# Patient Record
Sex: FEMALE | Race: WHITE | NOT HISPANIC OR LATINO | URBAN - METROPOLITAN AREA
[De-identification: names, ages, dates, MRNs, and addresses within clinical notes are randomized per-mention and may not be internally consistent; named-entity substitution may affect disease eponyms.]

---

## 2017-06-05 ENCOUNTER — INPATIENT (INPATIENT)
Facility: HOSPITAL | Age: 62
LOS: 6 days | Discharge: HOME | DRG: 871 | End: 2017-06-12
Attending: HOSPITALIST | Admitting: HOSPITALIST
Payer: COMMERCIAL

## 2017-06-05 VITALS
SYSTOLIC BLOOD PRESSURE: 107 MMHG | HEART RATE: 90 BPM | DIASTOLIC BLOOD PRESSURE: 63 MMHG | OXYGEN SATURATION: 97 % | TEMPERATURE: 98 F | RESPIRATION RATE: 20 BRPM

## 2017-06-05 DIAGNOSIS — Z29.9 ENCOUNTER FOR PROPHYLACTIC MEASURES, UNSPECIFIED: ICD-10-CM

## 2017-06-05 DIAGNOSIS — R63.8 OTHER SYMPTOMS AND SIGNS CONCERNING FOOD AND FLUID INTAKE: ICD-10-CM

## 2017-06-05 DIAGNOSIS — M79.7 FIBROMYALGIA: ICD-10-CM

## 2017-06-05 DIAGNOSIS — A41.9 SEPSIS, UNSPECIFIED ORGANISM: ICD-10-CM

## 2017-06-05 DIAGNOSIS — J18.9 PNEUMONIA, UNSPECIFIED ORGANISM: ICD-10-CM

## 2017-06-05 LAB
EOSINOPHIL NFR BLD AUTO: 2 % — SIGNIFICANT CHANGE UP (ref 0–6)
LACTATE SERPL-SCNC: 1 MMOL/L — SIGNIFICANT CHANGE UP (ref 0.5–2)
LYMPHOCYTES # BLD AUTO: 4 % — LOW (ref 13–44)
MANUAL DIF COMMENT BLD-IMP: SIGNIFICANT CHANGE UP
MANUAL SMEAR VERIFICATION: SIGNIFICANT CHANGE UP
MONOCYTES NFR BLD AUTO: 2 % — SIGNIFICANT CHANGE UP (ref 2–14)
NEUTROPHILS NFR BLD AUTO: 66 % — SIGNIFICANT CHANGE UP (ref 43–77)
NEUTS BAND # BLD: 26 % — HIGH
PLAT MORPH BLD: (no result)
RBC BLD AUTO: NORMAL — SIGNIFICANT CHANGE UP

## 2017-06-05 PROCEDURE — 93010 ELECTROCARDIOGRAM REPORT: CPT | Mod: 59

## 2017-06-05 PROCEDURE — 71020: CPT | Mod: 26

## 2017-06-05 PROCEDURE — 99223 1ST HOSP IP/OBS HIGH 75: CPT

## 2017-06-05 PROCEDURE — 99285 EMERGENCY DEPT VISIT HI MDM: CPT | Mod: 25

## 2017-06-05 RX ORDER — HEPARIN SODIUM 5000 [USP'U]/ML
5000 INJECTION INTRAVENOUS; SUBCUTANEOUS EVERY 8 HOURS
Qty: 0 | Refills: 0 | Status: DISCONTINUED | OUTPATIENT
Start: 2017-06-05 | End: 2017-06-12

## 2017-06-05 RX ORDER — IPRATROPIUM/ALBUTEROL SULFATE 18-103MCG
3 AEROSOL WITH ADAPTER (GRAM) INHALATION EVERY 4 HOURS
Qty: 0 | Refills: 0 | Status: DISCONTINUED | OUTPATIENT
Start: 2017-06-05 | End: 2017-06-12

## 2017-06-05 RX ORDER — SODIUM CHLORIDE 9 MG/ML
1000 INJECTION INTRAMUSCULAR; INTRAVENOUS; SUBCUTANEOUS ONCE
Qty: 0 | Refills: 0 | Status: COMPLETED | OUTPATIENT
Start: 2017-06-05 | End: 2017-06-05

## 2017-06-05 RX ORDER — ACETAMINOPHEN 500 MG
975 TABLET ORAL ONCE
Qty: 0 | Refills: 0 | Status: COMPLETED | OUTPATIENT
Start: 2017-06-05 | End: 2017-06-05

## 2017-06-05 RX ORDER — ALPRAZOLAM 0.25 MG
0.5 TABLET ORAL ONCE
Qty: 0 | Refills: 0 | Status: DISCONTINUED | OUTPATIENT
Start: 2017-06-05 | End: 2017-06-05

## 2017-06-05 RX ORDER — IPRATROPIUM/ALBUTEROL SULFATE 18-103MCG
3 AEROSOL WITH ADAPTER (GRAM) INHALATION ONCE
Qty: 0 | Refills: 0 | Status: COMPLETED | OUTPATIENT
Start: 2017-06-05 | End: 2017-06-05

## 2017-06-05 RX ADMIN — SODIUM CHLORIDE 1000 MILLILITER(S): 9 INJECTION INTRAMUSCULAR; INTRAVENOUS; SUBCUTANEOUS at 16:05

## 2017-06-05 RX ADMIN — Medication 3 MILLILITER(S): at 17:15

## 2017-06-05 RX ADMIN — Medication 3 MILLILITER(S): at 23:52

## 2017-06-05 RX ADMIN — Medication 0.5 MILLIGRAM(S): at 23:52

## 2017-06-05 RX ADMIN — Medication 975 MILLIGRAM(S): at 16:21

## 2017-06-05 NOTE — ED ADULT NURSE NOTE - OBJECTIVE STATEMENT
60 y/o female w/ no PMH (only on supplements) c/o SOB, weakness, fevers, dark urine, chest pressure x5 days. Patient states, "I was having fevers and feeling weak last week so I had a home nurse come and give me 2000 mL NS to hydrate me. My MD gave me tamiflu and now I started a Z pack (first dose last night). The highest fever was 103 this morning, I took two advil at 10:00 am." Denies nausea, vomiting. Reports good PO intake. Denies syncope.

## 2017-06-05 NOTE — ED ADULT TRIAGE NOTE - CHIEF COMPLAINT QUOTE
patient BIBA form home complains of fever and cough with SOB x 5 days. she states that she took first dose of z-pack last night. patient states that at 10AM her temp was 102 and she took 2 tylenol. denies chest pain, dizziness. no medical problems

## 2017-06-05 NOTE — ED PROVIDER NOTE - PHYSICAL EXAMINATION
CONSTITUTIONAL: Well appearing, well nourished, awake, alert and in no apparent distress.  HEENT: Head is atraumatic. Eyes clear bilaterally, normal EOMI. Airway patent.  CARDIAC: Normal rate, regular rhythm.  Heart sounds S1, S2.   RESPIRATORY: Breath sounds clear and equal bilaterally. Rales in LLL.   GASTROINTESTINAL: Abdomen soft, non-tender, no guarding.  MUSCULOSKELETAL: Spine appears normal, range of motion is not limited, no muscle or joint tenderness.   NEUROLOGICAL: Alert and oriented, no focal deficits, no motor or sensory deficits.  SKIN: Skin normal color for race, warm, dry and intact. No evidence of rash.  PSYCHIATRIC: Alert and oriented to person, place, time/situation. normal mood and affect. no apparent risk to self or others.

## 2017-06-05 NOTE — H&P ADULT - NSHPLABSRESULTS_GEN_ALL_CORE
.  LABS:                         12.4   10.8  )-----------( 183      ( 05 Jun 2017 13:45 )             36.1     06-05    138  |  103  |  5<L>  ----------------------------<  117<H>  3.9   |  22  |  0.50    Ca    8.8      05 Jun 2017 13:45    TPro  6.8  /  Alb  3.3  /  TBili  0.7  /  DBili  x   /  AST  39  /  ALT  49<H>  /  AlkPhos  134<H>  06-05        CARDIAC MARKERS ( 05 Jun 2017 13:45 )  x     / <0.01 ng/mL / 28 U/L / x     / <1.0 ng/mL        Lactate, Blood: 1.0 mmoL/L (06-05 @ 14:46)      RADIOLOGY, EKG & ADDITIONAL TESTS: Reviewed.

## 2017-06-05 NOTE — H&P ADULT - NSHPREVIEWOFSYSTEMS_GEN_ALL_CORE
REVIEW OF SYSTEMS:    CONSTITUTIONAL: malaise, fever  EYES/ENT: No visual changes;  No vertigo or throat pain   NECK: No pain or stiffness  RESPIRATORY: SOB, cough (see HPI)  CARDIOVASCULAR: No chest pain or palpitations  GASTROINTESTINAL: No abdominal or epigastric pain. No nausea, vomiting, or hematemesis; No diarrhea or constipation. No melena or hematochezia.  GENITOURINARY: No dysuria, frequency or hematuria  NEUROLOGICAL: No numbness or weakness  SKIN: No itching, burning, rashes, or lesions   All other review of systems is negative unless indicated above.

## 2017-06-05 NOTE — H&P ADULT - PROBLEM SELECTOR PLAN 1
Pt meets SIRS criteria (Fever 101, HR 93), suspected source of infection is PNA, with LLL consolidation seen on CXR, egophony ausculated at left lung base on exam. Pt normotensive, mentating well, extremities WWP, making urine. Lactate 1.0  - Pt s/p 1L NS in ED

## 2017-06-05 NOTE — H&P ADULT - PROBLEM SELECTOR PLAN 2
Pt with no recent hospitalization or recent use of IV Abx (only took 1 dose of Azithromycin), reports rash with Penicillin  - Pt able to take PO, continue Levaquin 750mg PO daily Pt with no recent hospitalization or recent use of IV Abx (only took 1 dose of Azithromycin), reports rash with Penicillin  - f/u BCx  - Pt able to take PO, continue Levaquin 750mg PO daily  - c/w oxygen therapy with NC as needed Pt with no recent hospitalization or recent use of IV Abx (only took 1 dose of Azithromycin), reports rash with Penicillin  - f/u BCx  - Pt able to take PO, continue Levaquin 750mg PO daily  - c/w oxygen therapy with NC as needed, SpO2 %, though pt reports some chest tightness, so was placed on 2L NC  - Tylenol PRN for fever Pt with no recent hospitalization or recent use of IV Abx (only took 1 dose of Azithromycin), reports rash with Penicillin  - f/u BCx  - Pt able to take PO, continue Levaquin 750mg PO daily for 5 day course  - c/w oxygen therapy with NC as needed, SpO2 %, though pt reports some chest tightness, so was placed on 2L NC  - Tylenol PRN for fever

## 2017-06-05 NOTE — ED PROVIDER NOTE - MEDICAL DECISION MAKING DETAILS
s/s as above, less likely ACS, CHF. XR reviewed with large infiltrate, allergic to penicillin, levaquin given in ED. 1L administered by EMS, given one more litre NS. requiring low dose NC, pt preferring for admission, discussed with med.

## 2017-06-05 NOTE — H&P ADULT - HISTORY OF PRESENT ILLNESS
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 Pt recently returned [place] on Tuesday last week and started to develop non-productive cough on Thursday, with fevers up to 103 at home. She started taking Tamiflu without improvement, so took one dose of Azithromycin yesterday. She started to feel more SOB today with some chest tightness, so decided to come to the ED. Pt denies chills/rigors, recent weight loss, night sweats, runny nose, watery eyes, sore throat, wheezing, neck stiffness, photophobia, chest pain, palpitations, abdominal pain, n/v/d/c, dysuria. Pt denies any sick contacts, animal contacts, recent hospitalization or Abx use (except 1 dose of azithro). In ED, T 101. HR 93, /63, RR 17-20, [id number]% on RA/2L NC. Labs significant for WBC 10.8 with 89.2 Neutrophil, lactate wnl (1.0), CXR significant for LLL consolidation. Pt received 1L NS, Duoneb x1, Tylenol 975mg x1. 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 Pt recently returned [place] on Tuesday last week and started to develop non-productive cough on Thursday, with fevers up to 103 at home. She started taking Tamiflu without improvement, so took one dose of Azithromycin yesterday. She started to feel more SOB today with some chest tightness, so decided to come to the ED. Pt denies chills/rigors, recent weight loss, night sweats, runny nose, watery eyes, sore throat, wheezing, neck stiffness, photophobia, chest pain, palpitations, abdominal pain, n/v/d/c, dysuria. Pt denies any sick contacts, animal contacts, recent hospitalization or Abx use (except 1 dose of azithro). In ED, T 101. HR 93, /63, RR 17-20, [id number]% on RA/2L NC. Labs significant for WBC 10.8 with 89.2 Neutrophil, 26% Band, lactate wnl (1.0), CXR significant for LLL consolidation. Pt received 1L NS, Duoneb x1, Tylenol 975mg x1.

## 2017-06-05 NOTE — H&P ADULT - ATTENDING COMMENTS
61F hx fibromyalgia who presents with c/o mostly nonproductive cough and fevers since Thursday. Pt says she returned from [place] on Tuesday and 2d later started having symptoms. 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 She prescribed herself Azithromycin 1d ago, of which she took 1 dose. SOB worsened today, prompting ED visit. SIRS criteria met based on fever, tachycardia, RR. +hypoxia. +LLL infiltrate on CXR.  VS as above  NAD, AAOx3  Slightly dry MMs  Borderline tachycardic, no murmurs  +LLL decreased BS with +egophony  Abd benign  LLE with trace edema  Labs and imaging reviewed  A/P: 1) Sepsis 2/2 CAP--+SIRS with LLL infiltrate consistent with likely CAP. Pt is hypoxic. Continue Levaquin, supplemental O2 PRN. Nebs PRN. Guiafenesen. F/u cultures.  2) Elevated LFTs--Borderline. Likely related to sepsis. Will trend for now.  3) Fibromyalgia--Stable. No meds.

## 2017-06-05 NOTE — ED PROVIDER NOTE - OBJECTIVE STATEMENT
pt with hx of fibromyalgia presenting with sob 51 with hx of fibromyalgia presenting with sob, fever, cough, myalgia ongoing for the past week. Took one tablet of azithromycin  yesterday and stated fever still persisting. no abd pain, vomiting, nausea. no known sick contacts. 51 with hx of fibromyalgia presenting with sob, fever, cough, myalgia ongoing for the past week. Took one tablet of azithromycin  yesterday and stated fever still persisting. no abd pain, vomiting, nausea. no known sick contacts. retired internist by profession.

## 2017-06-05 NOTE — H&P ADULT - NSHPPHYSICALEXAM_GEN_ALL_CORE
.  VITAL SIGNS:  T(C): 38.3, Max: 38.3 (06-05 @ 16:12)  T(F): 101, Max: 101 (06-05 @ 16:12)  HR: 93 (81 - 93)  BP: 121/72 (107/63 - 128/66)  BP(mean): --  RR: 17 (17 - 20)  SpO2: 99% (97% - 100%)  Wt(kg): --    PHYSICAL EXAM:    Constitutional: WDWN resting comfortably in bed; NAD  Head: NC/AT  Eyes: PERRLA, EOMI, clear conjunctiva  ENT: no nasal discharge; no oropharyngeal erythema or exudates; dry oral mucosa  Neck: supple; no JVD or thyromegaly  Respiratory: decreased breath sounds at left base with egophony, right lung sounds clear to auscultation, no wheezing or rhonchi, no accessory muscle use  Cardiac: +S1/S2; RRR; no M/R/G; PMI non-displaced  Gastrointestinal: soft, NT/ND; no rebound or guarding; +BS, no hepatosplenomegaly  Extremities: WWP, no clubbing or cyanosis; no peripheral edema  Vascular: 2+ radial, DP/PT pulses B/L  Lymphatic: no submandibular or cervical LAD  Neurologic: AAOx3; no focal deficits, answers questions appropriately, follows commands, moves all extremities .  VITAL SIGNS:  T(C): 38.3, Max: 38.3 (06-05 @ 16:12)  T(F): 101, Max: 101 (06-05 @ 16:12)  HR: 93 (81 - 93)  BP: 121/72 (107/63 - 128/66)  BP(mean): --  RR: 17 (17 - 20)  SpO2: 99% (97% - 100%)  Wt(kg): --    PHYSICAL EXAM:    Constitutional: WDWN resting comfortably in bed; NAD, non-toxic appearing  Head: NC/AT  Eyes: PERRLA, EOMI, clear conjunctiva  ENT: no nasal discharge; no oropharyngeal erythema or exudates; dry oral mucosa  Neck: supple; no JVD or thyromegaly  Respiratory: decreased breath sounds at left base with egophony, right lung sounds clear to auscultation, no wheezing or rhonchi, no accessory muscle use  Cardiac: +S1/S2; RRR; no M/R/G; PMI non-displaced  Gastrointestinal: soft, NT/ND; no rebound or guarding; +BS, no hepatosplenomegaly  Extremities: WWP, no clubbing or cyanosis; no peripheral edema  Vascular: 2+ radial, DP/PT pulses B/L  Lymphatic: no submandibular or cervical LAD  Neurologic: AAOx3; no focal deficits, answers questions appropriately, follows commands, moves all extremities

## 2017-06-05 NOTE — H&P ADULT - ASSESSMENT
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

## 2017-06-05 NOTE — ED PROVIDER NOTE - DIAGNOSTIC INTERPRETATION
ER Physician: Viviana Bang MD  CHEST XRAY INTERPRETATION: large LLL infiltrate, heart shadow normal, bony structures intact

## 2017-06-06 LAB
ANION GAP SERPL CALC-SCNC: 13 MMOL/L — SIGNIFICANT CHANGE UP (ref 5–17)
APPEARANCE UR: CLEAR — SIGNIFICANT CHANGE UP
BASOPHILS NFR BLD AUTO: 0.2 % — SIGNIFICANT CHANGE UP (ref 0–2)
BILIRUB UR-MCNC: NEGATIVE — SIGNIFICANT CHANGE UP
BUN SERPL-MCNC: 5 MG/DL — LOW (ref 7–23)
CALCIUM SERPL-MCNC: 8.4 MG/DL — SIGNIFICANT CHANGE UP (ref 8.4–10.5)
CHLORIDE SERPL-SCNC: 105 MMOL/L — SIGNIFICANT CHANGE UP (ref 96–108)
CO2 SERPL-SCNC: 22 MMOL/L — SIGNIFICANT CHANGE UP (ref 22–31)
COLOR SPEC: YELLOW — SIGNIFICANT CHANGE UP
CREAT SERPL-MCNC: 0.5 MG/DL — SIGNIFICANT CHANGE UP (ref 0.5–1.3)
DIFF PNL FLD: NEGATIVE — SIGNIFICANT CHANGE UP
EOSINOPHIL NFR BLD AUTO: 0.2 % — SIGNIFICANT CHANGE UP (ref 0–6)
GLUCOSE SERPL-MCNC: 129 MG/DL — HIGH (ref 70–99)
GLUCOSE UR QL: NEGATIVE — SIGNIFICANT CHANGE UP
HCT VFR BLD CALC: 31.7 % — LOW (ref 34.5–45)
HGB BLD-MCNC: 10.7 G/DL — LOW (ref 11.5–15.5)
KETONES UR-MCNC: NEGATIVE — SIGNIFICANT CHANGE UP
LEUKOCYTE ESTERASE UR-ACNC: NEGATIVE — SIGNIFICANT CHANGE UP
LYMPHOCYTES # BLD AUTO: 5.4 % — LOW (ref 13–44)
MAGNESIUM SERPL-MCNC: 1.9 MG/DL — SIGNIFICANT CHANGE UP (ref 1.6–2.6)
MCHC RBC-ENTMCNC: 29.4 PG — SIGNIFICANT CHANGE UP (ref 27–34)
MCHC RBC-ENTMCNC: 33.8 G/DL — SIGNIFICANT CHANGE UP (ref 32–36)
MCV RBC AUTO: 87.1 FL — SIGNIFICANT CHANGE UP (ref 80–100)
MONOCYTES NFR BLD AUTO: 5.9 % — SIGNIFICANT CHANGE UP (ref 2–14)
NEUTROPHILS NFR BLD AUTO: 88.3 % — HIGH (ref 43–77)
NITRITE UR-MCNC: NEGATIVE — SIGNIFICANT CHANGE UP
PH UR: 6 — SIGNIFICANT CHANGE UP (ref 5–8)
PLATELET # BLD AUTO: 179 K/UL — SIGNIFICANT CHANGE UP (ref 150–400)
POTASSIUM SERPL-MCNC: 3.7 MMOL/L — SIGNIFICANT CHANGE UP (ref 3.5–5.3)
POTASSIUM SERPL-SCNC: 3.7 MMOL/L — SIGNIFICANT CHANGE UP (ref 3.5–5.3)
PROT UR-MCNC: NEGATIVE MG/DL — SIGNIFICANT CHANGE UP
RAPID RVP RESULT: SIGNIFICANT CHANGE UP
RBC # BLD: 3.64 M/UL — LOW (ref 3.8–5.2)
RBC # FLD: 14 % — SIGNIFICANT CHANGE UP (ref 10.3–16.9)
SODIUM SERPL-SCNC: 140 MMOL/L — SIGNIFICANT CHANGE UP (ref 135–145)
SP GR SPEC: <=1.005 — SIGNIFICANT CHANGE UP (ref 1–1.03)
UROBILINOGEN FLD QL: 0.2 E.U./DL — SIGNIFICANT CHANGE UP
WBC # BLD: 10.3 K/UL — SIGNIFICANT CHANGE UP (ref 3.8–10.5)
WBC # FLD AUTO: 10.3 K/UL — SIGNIFICANT CHANGE UP (ref 3.8–10.5)

## 2017-06-06 PROCEDURE — 71010: CPT | Mod: 26

## 2017-06-06 PROCEDURE — 99233 SBSQ HOSP IP/OBS HIGH 50: CPT | Mod: GC

## 2017-06-06 RX ORDER — SODIUM CHLORIDE 9 MG/ML
500 INJECTION INTRAMUSCULAR; INTRAVENOUS; SUBCUTANEOUS ONCE
Qty: 0 | Refills: 0 | Status: COMPLETED | OUTPATIENT
Start: 2017-06-06 | End: 2017-06-06

## 2017-06-06 RX ORDER — SODIUM CHLORIDE 9 MG/ML
1000 INJECTION INTRAMUSCULAR; INTRAVENOUS; SUBCUTANEOUS
Qty: 0 | Refills: 0 | Status: DISCONTINUED | OUTPATIENT
Start: 2017-06-06 | End: 2017-06-06

## 2017-06-06 RX ORDER — FUROSEMIDE 40 MG
20 TABLET ORAL ONCE
Qty: 0 | Refills: 0 | Status: COMPLETED | OUTPATIENT
Start: 2017-06-06 | End: 2017-06-06

## 2017-06-06 RX ORDER — PANTOPRAZOLE SODIUM 20 MG/1
40 TABLET, DELAYED RELEASE ORAL
Qty: 0 | Refills: 0 | Status: DISCONTINUED | OUTPATIENT
Start: 2017-06-06 | End: 2017-06-06

## 2017-06-06 RX ORDER — ACETAMINOPHEN 500 MG
650 TABLET ORAL EVERY 6 HOURS
Qty: 0 | Refills: 0 | Status: DISCONTINUED | OUTPATIENT
Start: 2017-06-06 | End: 2017-06-08

## 2017-06-06 RX ADMIN — SODIUM CHLORIDE 1000 MILLILITER(S): 9 INJECTION INTRAMUSCULAR; INTRAVENOUS; SUBCUTANEOUS at 03:46

## 2017-06-06 RX ADMIN — HEPARIN SODIUM 5000 UNIT(S): 5000 INJECTION INTRAVENOUS; SUBCUTANEOUS at 21:04

## 2017-06-06 RX ADMIN — SODIUM CHLORIDE 250 MILLILITER(S): 9 INJECTION INTRAMUSCULAR; INTRAVENOUS; SUBCUTANEOUS at 10:54

## 2017-06-06 RX ADMIN — HEPARIN SODIUM 5000 UNIT(S): 5000 INJECTION INTRAVENOUS; SUBCUTANEOUS at 06:17

## 2017-06-06 RX ADMIN — Medication 3 MILLILITER(S): at 23:53

## 2017-06-06 RX ADMIN — Medication 650 MILLIGRAM(S): at 10:53

## 2017-06-06 RX ADMIN — Medication 20 MILLIGRAM(S): at 16:10

## 2017-06-06 RX ADMIN — Medication 650 MILLIGRAM(S): at 23:37

## 2017-06-06 RX ADMIN — PANTOPRAZOLE SODIUM 40 MILLIGRAM(S): 20 TABLET, DELAYED RELEASE ORAL at 07:12

## 2017-06-06 RX ADMIN — SODIUM CHLORIDE 500 MILLILITER(S): 9 INJECTION INTRAMUSCULAR; INTRAVENOUS; SUBCUTANEOUS at 09:20

## 2017-06-06 RX ADMIN — SODIUM CHLORIDE 80 MILLILITER(S): 9 INJECTION INTRAMUSCULAR; INTRAVENOUS; SUBCUTANEOUS at 09:20

## 2017-06-06 RX ADMIN — HEPARIN SODIUM 5000 UNIT(S): 5000 INJECTION INTRAVENOUS; SUBCUTANEOUS at 13:46

## 2017-06-06 RX ADMIN — Medication 650 MILLIGRAM(S): at 03:46

## 2017-06-06 RX ADMIN — Medication 650 MILLIGRAM(S): at 16:10

## 2017-06-06 NOTE — PROGRESS NOTE ADULT - SUBJECTIVE AND OBJECTIVE BOX
OVERNIGHT EVENTS:    VITAL SIGNS:   Vital Signs Last 24 Hrs  T(F): 99.1, Max: 103.1 (06-06 @ 03:22)  HR: 90 (81 - 105)  BP: 89/46 (89/46 - 128/66)  RR: 19 (17 - 20)  SpO2: 95% (93% - 100%)  Wt(kg): --    CAPILLARY BLOOD GLUCOSE      I&O's Summary      PHYSICAL EXAM:  Constitutional: well appearing, WDWN, NAD  HEENT: NCAT, PEERL, sclera non-icteric, no conjunctival pallor  Neck: supple, no JVD  Respiratory: CTA b/l, no wheezes, No rales, No rhonchi  Cardiovascular: RRR, normal s1/s2, no MRG  Gastrointestinal: soft, NTND, +BS, no guarding, no organomegaly  Extremities: WWP, DP/radial pulses 2+ b/l, no edema  Neurological: AAOx 3, responds to commands, moves all extremities, CN 2-12 intact  Musculoskeletal: 5/5 strength throughout    MEDICATIONS  (STANDING):  heparin  Injectable 5000Unit(s) SubCutaneous every 8 hours  levoFLOXacin  Tablet 750milliGRAM(s) Oral every 24 hours  pantoprazole    Tablet 40milliGRAM(s) Oral before breakfast  sodium chloride 0.9%. 1000milliLiter(s) IV Continuous <Continuous>  sodium chloride 0.9% Bolus 500milliLiter(s) IV Bolus once    MEDICATIONS  (PRN):  ALBUTerol/ipratropium for Nebulization 3milliLiter(s) Nebulizer every 4 hours PRN Shortness of Breath and/or Wheezing  guaiFENesin/dextromethorphan  Syrup 10milliLiter(s) Oral every 6 hours PRN Cough  acetaminophen   Tablet 650milliGRAM(s) Oral every 6 hours PRN For Temp greater than 38 C (100.4 F)      ALLERGIES: penicillins (Unknown)      INTOLERANCES:   LABS:                        12.4   10.8  )-----------( 183      ( 05 Jun 2017 13:45 )             36.1     06-05    138  |  103  |  5<L>  ----------------------------<  117<H>  3.9   |  22  |  0.50    Ca    8.8      05 Jun 2017 13:45    TPro  6.8  /  Alb  3.3  /  TBili  0.7  /  DBili  x   /  AST  39  /  ALT  49<H>  /  AlkPhos  134<H>  06-05        RADIOLOGY & ADDITIONAL TESTS: OVERNIGHT EVENTS: Febrile overnight to 103, meeting sepsis criteria, given NS bolus and tylenol, Levaquin continued.     SUBJECTIVE: Feels weak,  no SOB, no CP, +BM, no dysuria, no abdominal pain, no HA/N/V.    VITAL SIGNS: Last 24 Hrs  T(F): 99.1, Max: 103.1 (06-06 @ 03:22)  HR: 90 (81 - 105)  BP: 89/46 (89/46 - 128/66)  RR: 19 (17 - 20)  SpO2: 95% (93% - 100%)    PHYSICAL EXAM:  Constitutional: WDWN, breathing comfortably on NC, weak appearing  HEENT: NCAT, PERRL-A, sclera non-icteric, no conjunctival pallor, dry MM, neck supple, no JVD  Respiratory: CTA b/l, no wheezes, No rales, No rhonchi  Cardiovascular: RRR, normal s1/s2, no MRG  Gastrointestinal: soft, NTND, +BS, no guarding, no organomegaly  Extremities: WWP, DP/radial pulses 2+ b/l, no edema  Neurological: AAOx 3, responds to commands, moves all extremities, CN 2-12 intact  Musculoskeletal: 5/5 strength throughout    MEDICATIONS  (STANDING):  heparin  Injectable 5000Unit(s) SubCutaneous every 8 hours  levoFLOXacin  Tablet 750milliGRAM(s) Oral every 24 hours  pantoprazole    Tablet 40milliGRAM(s) Oral before breakfast  sodium chloride 0.9%. 1000milliLiter(s) IV Continuous <Continuous>  sodium chloride 0.9% Bolus 500milliLiter(s) IV Bolus once    MEDICATIONS  (PRN):  ALBUTerol/ipratropium for Nebulization 3milliLiter(s) Nebulizer every 4 hours PRN Shortness of Breath and/or Wheezing  guaiFENesin/dextromethorphan  Syrup 10milliLiter(s) Oral every 6 hours PRN Cough  acetaminophen   Tablet 650milliGRAM(s) Oral every 6 hours PRN For Temp greater than 38 C (100.4 F)      ALLERGIES: penicillins (Unknown)      INTOLERANCES:   LABS:                        12.4   10.8  )-----------( 183      ( 05 Jun 2017 13:45 )             36.1     06-05    138  |  103  |  5<L>  ----------------------------<  117<H>  3.9   |  22  |  0.50    Ca    8.8      05 Jun 2017 13:45    TPro  6.8  /  Alb  3.3  /  TBili  0.7  /  DBili  x   /  AST  39  /  ALT  49<H>  /  AlkPhos  134<H>  06-05        RADIOLOGY & ADDITIONAL TESTS: OVERNIGHT EVENTS: Febrile overnight to 103, meeting sepsis criteria, given NS bolus and tylenol, Levaquin continued.     SUBJECTIVE: Feels weak,  no SOB, no CP, +BM, no dysuria, no abdominal pain, no HA/N/V.    VITAL SIGNS: Last 24 Hrs  T(F): 99.1, Max: 103.1 (06-06 @ 03:22)  HR: 90 (81 - 105)  BP: 89/46 (89/46 - 128/66)  RR: 19 (17 - 20)  SpO2: 95% (93% - 100%)    PHYSICAL EXAM:  Constitutional: WDWN, breathing comfortably on NC, weak appearing  HEENT: NCAT, PERRL-A, sclera non-icteric, no conjunctival pallor, dry MM, neck supple, no JVD  Respiratory: Good respiratory effort, decreased breath sounds on the LLL, +egophony on LLL, no rales, no wheezing  Cardiovascular: RRR, normal s1/s2, no MRG  Gastrointestinal: soft, NTND, +BS, no guarding, no organomegaly  Extremities: WWP, DP/radial pulses 2+ b/l, no edema  Neurological: AAOx 3, responds to commands, moves all extremities, CN 2-12 intact  Musculoskeletal: 5/5 strength throughout    MEDICATIONS  (STANDING):  heparin  Injectable 5000Unit(s) SubCutaneous every 8 hours  levoFLOXacin  Tablet 750milliGRAM(s) Oral every 24 hours  pantoprazole    Tablet 40milliGRAM(s) Oral before breakfast  sodium chloride 0.9%. 1000milliLiter(s) IV Continuous <Continuous>  sodium chloride 0.9% Bolus 500milliLiter(s) IV Bolus once    MEDICATIONS  (PRN):  ALBUTerol/ipratropium for Nebulization 3milliLiter(s) Nebulizer every 4 hours PRN Shortness of Breath and/or Wheezing  guaiFENesin/dextromethorphan  Syrup 10milliLiter(s) Oral every 6 hours PRN Cough  acetaminophen   Tablet 650milliGRAM(s) Oral every 6 hours PRN For Temp greater than 38 C (100.4 F)    ALLERGIES: penicillins (Unknown)    LABS:  ( 06 Jun 2017 07:58 )                        10.7   10.3  )-----------( 179                   31.7     140  |  105  |  5<L>  ----------------------------<  129<H>  3.7   |  22  |  0.50    Ca    8.4     Mg     1.9         Urinalysis Basic - ( 06 Jun 2017 11:08 )    Color: Yellow / Appearance: Clear / SG: <=1.005 / pH: x  Gluc: x / Ketone: NEGATIVE  / Bili: NEGATIVE / Urobili: 0.2 E.U./dL   Blood: x / Protein: NEGATIVE mg/dL / Nitrite: NEGATIVE   Leuk Esterase: NEGATIVE / RBC: x / WBC x   Sq Epi: x / Non Sq Epi: x / Bacteria: x

## 2017-06-06 NOTE — PROGRESS NOTE ADULT - PROBLEM SELECTOR PLAN 2
-  Pt with no recent hospitalization or recent use of IV Abx (only took 1 dose of Azithromycin), reports rash with Penicillin. s/p 500mg IV Levaquin in the ED.  - c/w Levaquin 750mg PO daily for 5 day course (day 2/5)  - c/w oxygen therapy with NC as needed, SpO2 %, though pt reports some chest tightness, so was placed on 2L NC  - Tylenol PRN for fever  - f/u Blood cx, RVP

## 2017-06-06 NOTE — PROGRESS NOTE ADULT - ATTENDING COMMENTS
Pt. seen and examined by me.  Agree with Dr. Rios's findings, assessment and plan as stated above.  The patient had fever, tachycardia and LLL infiltrate.  Bolus given for sepsis protocol of 30cc/kg, however, patient became fluid overloaded with crackles, oxygen desaturation and fluid on CXR.  Given 20mg of Lasix IV.  Fluid D/C's and Echo ordered.  Will monitor pulmonary status, bp, Is/Os.  Con't levaquin 750mg.

## 2017-06-06 NOTE — PROGRESS NOTE ADULT - PROBLEM SELECTOR PLAN 1
-  Pt met Sepsis criteria overnight secondary to LLL PNA. s/p 1L NS bolus in the ED and 500cc NS bolus overnight.   - c/t trend CBC, monitor vitals  - Will c/w NS maintenance in the setting of poor PO intake  - c/w Tylenol PRN for T>100.4

## 2017-06-07 LAB
ANION GAP SERPL CALC-SCNC: 12 MMOL/L — SIGNIFICANT CHANGE UP (ref 5–17)
APPEARANCE UR: CLEAR — SIGNIFICANT CHANGE UP
BILIRUB UR-MCNC: NEGATIVE — SIGNIFICANT CHANGE UP
BUN SERPL-MCNC: 5 MG/DL — LOW (ref 7–23)
CALCIUM SERPL-MCNC: 8.6 MG/DL — SIGNIFICANT CHANGE UP (ref 8.4–10.5)
CHLORIDE SERPL-SCNC: 102 MMOL/L — SIGNIFICANT CHANGE UP (ref 96–108)
CO2 SERPL-SCNC: 24 MMOL/L — SIGNIFICANT CHANGE UP (ref 22–31)
COLOR SPEC: YELLOW — SIGNIFICANT CHANGE UP
CREAT SERPL-MCNC: 0.5 MG/DL — SIGNIFICANT CHANGE UP (ref 0.5–1.3)
DIFF PNL FLD: NEGATIVE — SIGNIFICANT CHANGE UP
GLUCOSE SERPL-MCNC: 116 MG/DL — HIGH (ref 70–99)
GLUCOSE UR QL: NEGATIVE — SIGNIFICANT CHANGE UP
HCT VFR BLD CALC: 31.7 % — LOW (ref 34.5–45)
HGB BLD-MCNC: 10.8 G/DL — LOW (ref 11.5–15.5)
KETONES UR-MCNC: NEGATIVE — SIGNIFICANT CHANGE UP
LEUKOCYTE ESTERASE UR-ACNC: NEGATIVE — SIGNIFICANT CHANGE UP
MAGNESIUM SERPL-MCNC: 1.8 MG/DL — SIGNIFICANT CHANGE UP (ref 1.6–2.6)
MCHC RBC-ENTMCNC: 29.4 PG — SIGNIFICANT CHANGE UP (ref 27–34)
MCHC RBC-ENTMCNC: 34.1 G/DL — SIGNIFICANT CHANGE UP (ref 32–36)
MCV RBC AUTO: 86.4 FL — SIGNIFICANT CHANGE UP (ref 80–100)
NITRITE UR-MCNC: NEGATIVE — SIGNIFICANT CHANGE UP
PH UR: 7 — SIGNIFICANT CHANGE UP (ref 5–8)
PLATELET # BLD AUTO: 236 K/UL — SIGNIFICANT CHANGE UP (ref 150–400)
POTASSIUM SERPL-MCNC: 3.4 MMOL/L — LOW (ref 3.5–5.3)
POTASSIUM SERPL-SCNC: 3.4 MMOL/L — LOW (ref 3.5–5.3)
PROT UR-MCNC: NEGATIVE MG/DL — SIGNIFICANT CHANGE UP
RBC # BLD: 3.67 M/UL — LOW (ref 3.8–5.2)
RBC # FLD: 13.9 % — SIGNIFICANT CHANGE UP (ref 10.3–16.9)
SODIUM SERPL-SCNC: 138 MMOL/L — SIGNIFICANT CHANGE UP (ref 135–145)
SP GR SPEC: <=1.005 — SIGNIFICANT CHANGE UP (ref 1–1.03)
UROBILINOGEN FLD QL: 0.2 E.U./DL — SIGNIFICANT CHANGE UP
WBC # BLD: 9.3 K/UL — SIGNIFICANT CHANGE UP (ref 3.8–10.5)
WBC # FLD AUTO: 9.3 K/UL — SIGNIFICANT CHANGE UP (ref 3.8–10.5)

## 2017-06-07 PROCEDURE — 71250 CT THORAX DX C-: CPT | Mod: 26

## 2017-06-07 PROCEDURE — 93306 TTE W/DOPPLER COMPLETE: CPT | Mod: 26

## 2017-06-07 PROCEDURE — 95018 ALL TSTG PERQ&IQ DRUGS/BIOL: CPT

## 2017-06-07 PROCEDURE — 95017 ALL TSTG PERQ&IQ W/VENOMS: CPT

## 2017-06-07 PROCEDURE — 71010: CPT | Mod: 26

## 2017-06-07 PROCEDURE — 99233 SBSQ HOSP IP/OBS HIGH 50: CPT | Mod: 25

## 2017-06-07 RX ORDER — PIPERACILLIN AND TAZOBACTAM 4; .5 G/20ML; G/20ML
4.5 INJECTION, POWDER, LYOPHILIZED, FOR SOLUTION INTRAVENOUS EVERY 6 HOURS
Qty: 0 | Refills: 0 | Status: DISCONTINUED | OUTPATIENT
Start: 2017-06-08 | End: 2017-06-09

## 2017-06-07 RX ORDER — ALPRAZOLAM 0.25 MG
0.5 TABLET ORAL ONCE
Qty: 0 | Refills: 0 | Status: DISCONTINUED | OUTPATIENT
Start: 2017-06-07 | End: 2017-06-07

## 2017-06-07 RX ORDER — PIPERACILLIN AND TAZOBACTAM 4; .5 G/20ML; G/20ML
INJECTION, POWDER, LYOPHILIZED, FOR SOLUTION INTRAVENOUS
Qty: 0 | Refills: 0 | Status: DISCONTINUED | OUTPATIENT
Start: 2017-06-08 | End: 2017-06-09

## 2017-06-07 RX ORDER — PANTOPRAZOLE SODIUM 20 MG/1
40 TABLET, DELAYED RELEASE ORAL
Qty: 0 | Refills: 0 | Status: DISCONTINUED | OUTPATIENT
Start: 2017-06-07 | End: 2017-06-12

## 2017-06-07 RX ORDER — MAGNESIUM SULFATE 500 MG/ML
1 VIAL (ML) INJECTION ONCE
Qty: 0 | Refills: 0 | Status: COMPLETED | OUTPATIENT
Start: 2017-06-07 | End: 2017-06-07

## 2017-06-07 RX ORDER — PIPERACILLIN AND TAZOBACTAM 4; .5 G/20ML; G/20ML
4.5 INJECTION, POWDER, LYOPHILIZED, FOR SOLUTION INTRAVENOUS ONCE
Qty: 0 | Refills: 0 | Status: COMPLETED | OUTPATIENT
Start: 2017-06-07 | End: 2017-06-08

## 2017-06-07 RX ORDER — POTASSIUM CHLORIDE 20 MEQ
40 PACKET (EA) ORAL EVERY 4 HOURS
Qty: 0 | Refills: 0 | Status: COMPLETED | OUTPATIENT
Start: 2017-06-07 | End: 2017-06-07

## 2017-06-07 RX ORDER — VANCOMYCIN HCL 1 G
1000 VIAL (EA) INTRAVENOUS ONCE
Qty: 0 | Refills: 0 | Status: COMPLETED | OUTPATIENT
Start: 2017-06-07 | End: 2017-06-08

## 2017-06-07 RX ORDER — VANCOMYCIN HCL 1 G
1000 VIAL (EA) INTRAVENOUS EVERY 12 HOURS
Qty: 0 | Refills: 0 | Status: DISCONTINUED | OUTPATIENT
Start: 2017-06-08 | End: 2017-06-09

## 2017-06-07 RX ORDER — VANCOMYCIN HCL 1 G
VIAL (EA) INTRAVENOUS
Qty: 0 | Refills: 0 | Status: DISCONTINUED | OUTPATIENT
Start: 2017-06-08 | End: 2017-06-09

## 2017-06-07 RX ADMIN — Medication 650 MILLIGRAM(S): at 21:49

## 2017-06-07 RX ADMIN — Medication 40 MILLIEQUIVALENT(S): at 09:33

## 2017-06-07 RX ADMIN — Medication 100 GRAM(S): at 09:33

## 2017-06-07 RX ADMIN — Medication 650 MILLIGRAM(S): at 14:12

## 2017-06-07 RX ADMIN — HEPARIN SODIUM 5000 UNIT(S): 5000 INJECTION INTRAVENOUS; SUBCUTANEOUS at 06:23

## 2017-06-07 RX ADMIN — HEPARIN SODIUM 5000 UNIT(S): 5000 INJECTION INTRAVENOUS; SUBCUTANEOUS at 14:12

## 2017-06-07 RX ADMIN — PANTOPRAZOLE SODIUM 40 MILLIGRAM(S): 20 TABLET, DELAYED RELEASE ORAL at 06:23

## 2017-06-07 RX ADMIN — Medication 40 MILLIEQUIVALENT(S): at 14:29

## 2017-06-07 RX ADMIN — Medication 0.5 MILLIGRAM(S): at 01:05

## 2017-06-07 RX ADMIN — Medication 650 MILLIGRAM(S): at 07:35

## 2017-06-07 RX ADMIN — HEPARIN SODIUM 5000 UNIT(S): 5000 INJECTION INTRAVENOUS; SUBCUTANEOUS at 21:49

## 2017-06-07 NOTE — PROGRESS NOTE ADULT - ATTENDING COMMENTS
Patient was seen and examined by me at bedside. I agree with resident's note, subjective, objective physical exam, assessment and plan with following modifications/additions.     1) Sepsis POA 2/2 CAP --Improved.  2) LLL PNA --CXR looks like evolving multifocal PNA.  3) Pulmonary vascular congestion. --Improved  4) Fibromyalgia.    A/P: Clinically improved. Still spiking low grade fevers; however, fever curve is coming down as well as WBC. Today CXR looks like Pulmonary vascular congestion and large LLL PNA with likely evolving RML pna. Concerning for multifocal PNA. Given clinical improvement, will c/w CAP tx with Levaquin for now. Patient was seen and examined by me at bedside. I agree with resident's note, subjective, objective physical exam, assessment and plan with following modifications/additions.     1) Sepsis POA 2/2 CAP --Improved.  2) LLL PNA --CXR looks like evolving multifocal PNA.  3) Acute hypoxemic respiratory failure 2/2 to fluid overload.  4) Pulmonary vascular congestion. --Improved  5) Fibromyalgia.    A/P: Clinically improved. Still spiking low grade fevers; however, fever curve is coming down as well as WBCs. Today CXR looks like persistent pulmonary vascular congestion and large LLL PNA with likely evolving RML/RLL pna. Concerning for evolving multifocal PNA. Given clinical improvement and radiologic lag will c/w CAP tx with Levaquin for now. Tried off O2 in bed and O2sat dropped to 90-91%. Will go down from 4L to 2L today +/- extra dose of Lasix today.   Est DC on 6/8/17 pending clinical improvement. Patient was seen and examined by me at bedside. I agree with resident's note, subjective, objective physical exam, assessment and plan with following modifications/additions.     1) Sepsis POA 2/2 CAP --Improved.  2) LLL PNA --CXR looks like evolving multifocal PNA.  3) Acute hypoxemic respiratory failure 2/2 to fluid overload.  4) Pulmonary vascular congestion. --Improved  5) Fibromyalgia.    A/P: Still spiking low grade fevers; however, fever curve is coming down as well as WBCs. Today CXR looks like persistent pulmonary vascular congestion, large LLL PNA with likely evolving RML/RLL pna, concerning for evolving multifocal PNA. Given clinical improvement and radiologic lag will c/w CAP tx with Levaquin for now. Tried off O2 in bed and O2sat dropped to 90-91%. Will go down from 4L to 2L today.   Will get CT chest now. Will do PCN allergy skin testing and re-culture blood. Patient was seen and examined by me at bedside. I agree with resident's note, subjective, objective physical exam, assessment and plan with following modifications/additions.     1) Sepsis POA 2/2 CAP --Improved.  2) LLL PNA --CXR looks like evolving multifocal PNA.  3) Acute hypoxemic respiratory failure 2/2 to fluid overload.  4) Pulmonary vascular congestion. --Improved  5) Fibromyalgia.    A/P: Still spiking low grade fevers; however, fever curve is coming down as well as WBCs. Today CXR looks like persistent pulmonary vascular congestion, large LLL PNA with likely evolving RML/RLL pna, concerning for evolving multifocal PNA. Given clinical improvement and radiologic lag will c/w CAP tx with Levaquin for now. Tried off O2 in bed and O2sat dropped to 90-91%. Will go down from 4L to 2L today.   Will get CT chest now. Will do PCN allergy skin testing, re-culture blood and send sputum cx.

## 2017-06-07 NOTE — CHART NOTE - NSCHARTNOTEFT_GEN_A_CORE
**PRELIMINARY    Penicillin Allergy Skin Testing    DX: Reported PCN drug allergy -Reported as rash    Exam:  Gen: NAD  HEENT: MMM  Skin Exam: Skin no lesions  Lungs: Bilateral crackles L>>R, egophony  Ext: No C/C/E    1) Informed consent was obtained and time-out was performed.    2) Scratch test:   * Histamine: 0 mm   * Saline diluent: 0 mm  * Pre-Pen: 0 mm  * Penicillin  mm    3) Intradermal test:   * Saline diluent: 0 mm  * Pre-pen #1: 0 mm (no growth beyond original size)  * Pre-pen #2: 0 mm (no growth beyond original size)  * Penicillin G #1: 0 mm (no growth beyond original size)  * Penicillin G #2: 0 mm (no growth beyond original size)    Procedure was performed successfully without complications.    Results: Negative Penicillin Allergy Skin Testing    Results were explained to the patient and communicated with primary team.    Procedure was performed by Dr. Giron and supervised by Dr. Darron Coello.    Time face to face 60 minutes with >50% on counseling and coordination of care. **PRELIMINARY    Penicillin Allergy Skin Testing    DX: Reported PCN drug allergy -Reported as rash    Exam:  Gen: NAD  HEENT: MMM  Skin Exam: Skin no lesions  Lungs: Bilateral crackles L>>R, egophony  Ext: No C/C/E    1) Informed consent was obtained and time-out was performed.    2) Scratch test: NEGATIVE  * Histamine: 6 mm   * Saline diluent: 0 mm  * Pre-Pen: 0 mm  * Penicillin  mm    3) Intradermal test:   * Saline diluent: 0 mm  * Pre-pen #1: 0 mm (no growth beyond original size)  * Pre-pen #2: 0 mm (no growth beyond original size)  * Penicillin G #1: 0 mm (no growth beyond original size)  * Penicillin G #2: 0 mm (no growth beyond original size)    Procedure was performed successfully without complications.    Results: Negative Penicillin Allergy Skin Testing    Results were explained to the patient and communicated with primary team.    Procedure was performed by Dr. Giron and supervised by Dr. Darron Coello.    Time face to face 60 minutes with >50% on counseling and coordination of care. Penicillin Allergy Skin Testing    DX: Reported PCN drug allergy -Reported as rash    Exam:  Gen: NAD  HEENT: MMM  Skin Exam: Skin no lesions  Lungs: Bilateral crackles L>>R, egophony  Ext: No C/C/E    1) Informed consent was obtained and time-out was performed.    2) Scratch test: NEGATIVE  * Histamine: 6 mm   * Saline diluent: 0 mm  * Pre-Pen: 0 mm  * Penicillin  mm    3) Intradermal test: NEGATIVE  * Saline diluent: 0 mm  * Pre-pen #1: 0 mm (no growth beyond original size)  * Pre-pen #2: 0 mm (no growth beyond original size)  * Penicillin G #1: 0 mm (no growth beyond original size)  * Penicillin G #2: 0 mm (no growth beyond original size)    Procedure was performed successfully without complications.    Results: NEGATIVE Penicillin Allergy Skin Testing    Results were explained to the patient and communicated with primary team.    Procedure was performed by Dr. Giron and supervised by Dr. Darron Coello.    Time face to face 60 minutes with >50% on counseling and coordination of care.

## 2017-06-07 NOTE — PROGRESS NOTE ADULT - PROBLEM SELECTOR PLAN 2
-  Pt with no recent hospitalization or recent use of IV Abx (only took 1 dose of Azithromycin), reports rash with Penicillin. s/p 500mg IV Levaquin in the ED.  - c/w Levaquin 750mg PO daily for 5 day course (day 2/5)  - c/w oxygen therapy with NC as needed, SpO2 %, though pt reports some chest tightness, so was placed on 2L NC  - Tylenol PRN for fever  - f/u Blood cx, RVP -  Pt with no recent hospitalization or recent use of IV Abx (only took 1 dose of Azithromycin), reports rash with Penicillin.  RVP negative. Left base consolidation.  - c/w Levaquin 750mg PO daily for 5 day course (day 3/5)  - c/w oxygen therapy with NC as needed, SpO2 %, though pt reports some chest tightness, so was placed on 2L NC  - Tylenol PRN for fever  - f/u blood cx: No growth after x1 day Pt with no recent hospitalization or recent use of IV Abx (only took 1 dose of Azithromycin), reports rash with Penicillin.  RVP negative. Left base consolidation.  - c/w Levaquin 750mg PO daily for 5 day course (day 3/5)  - c/w oxygen therapy with NC as needed, SpO2 %, though pt reports some chest tightness, so was placed on 2L NC  - Tylenol PRN for fever  - f/u blood cx: No growth after x1 day Pt with no recent hospitalization or recent use of IV Abx (only took 1 dose of Azithromycin), reports rash with Penicillin.  RVP negative. Left base consolidation.  - c/w Levaquin 750mg PO daily for 5 day course (day 3/5)  - c/w oxygen therapy with NC as needed, SpO2 %, though pt reports some chest tightness, so was placed on 2L NC  - Tylenol PRN for fever  - f/u blood cx: No growth after x1 day  -Patient CXR with possible air bronchograms and pulmonary vascular congestion; possible multifocal pneumonia. If patient spikes fever again will culture again and have to broaden to vancomycin and zosyn to cover for HAP. May need another dose of lasix later today.

## 2017-06-07 NOTE — PROGRESS NOTE ADULT - PROBLEM SELECTOR PLAN 5
HSQ    FULL CODE HSQ    FULL CODE  Dispo: DC when clinically stable. HSQ    FULL CODE  Dispo: DC when clinically stable. PT consult as with fatigue and weakness.

## 2017-06-07 NOTE — PROGRESS NOTE ADULT - SUBJECTIVE AND OBJECTIVE BOX
INTERVAL HPI/OVERNIGHT EVENTS:  Patient was seen and examined at bedside. As per nurse and patient, no o/n events, patient resting comfortably. No complaints at this time. Patient denies fever, chills, dizziness, weakness, HA, Changes in vision, CP, palpitations, SOB, cough, N/V/D/C, dysuria, changes in bowel movements, LE edema.    VITAL SIGNS:  T(F): 98.2  HR: 74  BP: 113/76  RR: 20  SpO2: 95%  Wt(kg): --    PHYSICAL EXAM:    Constitutional: WDWN, NAD  Eyes: PERRL, EOMI, sclera non-icteric  Neck: supple, trachea midline, no masses, no JVD  Respiratory: CTA b/l, good air entry b/l, no wheezing, rhonchi, rales, without accessory muscle use and no intercostal retractions  Cardiovascular: RRR, normal S1S2, no M/R/G  Gastrointestinal: soft, NTND, no masses palpable, BS normal  Extremities: Warm, well perfused, pulses equal bilateral upper and lower extremities, no edema, no clubbing  Neurological: AAOx3, CN Grossly intact  Skin: Normal temperature, warm, dry    MEDICATIONS  (STANDING):  heparin  Injectable 5000Unit(s) SubCutaneous every 8 hours  levoFLOXacin  Tablet 750milliGRAM(s) Oral every 24 hours  pantoprazole    Tablet 40milliGRAM(s) Oral before breakfast    MEDICATIONS  (PRN):  ALBUTerol/ipratropium for Nebulization 3milliLiter(s) Nebulizer every 4 hours PRN Shortness of Breath and/or Wheezing  guaiFENesin/dextromethorphan  Syrup 10milliLiter(s) Oral every 6 hours PRN Cough  acetaminophen   Tablet 650milliGRAM(s) Oral every 6 hours PRN For Temp greater than 38 C (100.4 F)      Allergies    penicillins (Rash)    Intolerances        LABS:                        10.7   10.3  )-----------( 179      ( 06 Jun 2017 07:58 )             31.7     06-06    140  |  105  |  5<L>  ----------------------------<  129<H>  3.7   |  22  |  0.50    Ca    8.4      06 Jun 2017 07:58  Mg     1.9     06-06    TPro  6.8  /  Alb  3.3  /  TBili  0.7  /  DBili  x   /  AST  39  /  ALT  49<H>  /  AlkPhos  134<H>  06-05      Urinalysis Basic - ( 07 Jun 2017 00:58 )    Color: Yellow / Appearance: Clear / SG: <=1.005 / pH: x  Gluc: x / Ketone: NEGATIVE  / Bili: NEGATIVE / Urobili: 0.2 E.U./dL   Blood: x / Protein: NEGATIVE mg/dL / Nitrite: NEGATIVE   Leuk Esterase: NEGATIVE / RBC: x / WBC x   Sq Epi: x / Non Sq Epi: x / Bacteria: x        RADIOLOGY & ADDITIONAL TESTS: INTERVAL HPI/OVERNIGHT EVENTS:  Patient was seen and examined at bedside. Febrile overnight x2, 100.9 and 100.6 this AM; overall fever curve trending down. Patient with cough, fatigue; feels about the same since admission she states.     VITAL SIGNS:  T(F): 98.2  HR: 74  BP: 113/76  RR: 20  SpO2: 95%  Wt(kg): --    PHYSICAL EXAM:    Constitutional: WDWN, breathing comfortably on NC, weak appearing  HEENT: NCAT, PERRL, sclera non-icteric, no conjunctival pallor, dry MM, neck supple, no JVD  Respiratory: Poor respiratory effort (2/2 cough) , decreased breath sounds on the LLL, +egophony on LLL, no rales, no wheezing  Cardiovascular: RRR, normal s1/s2, no MRG  Gastrointestinal: soft, NTND, +BS, no guarding, no organomegaly  Extremities: WWP, DP/radial pulses 2+ b/l, no edema  Neurological: AAOx 3, responds to commands, moves all extremities, CN 2-12 intact  Musculoskeletal: 5/5 strength throughout    MEDICATIONS  (STANDING):  heparin  Injectable 5000Unit(s) SubCutaneous every 8 hours  levoFLOXacin  Tablet 750milliGRAM(s) Oral every 24 hours  pantoprazole    Tablet 40milliGRAM(s) Oral before breakfast    MEDICATIONS  (PRN):  ALBUTerol/ipratropium for Nebulization 3milliLiter(s) Nebulizer every 4 hours PRN Shortness of Breath and/or Wheezing  guaiFENesin/dextromethorphan  Syrup 10milliLiter(s) Oral every 6 hours PRN Cough  acetaminophen   Tablet 650milliGRAM(s) Oral every 6 hours PRN For Temp greater than 38 C (100.4 F)      Allergies    penicillins (Rash)    Intolerances        LABS:                        10.7   10.3  )-----------( 179      ( 06 Jun 2017 07:58 )             31.7     06-06    140  |  105  |  5<L>  ----------------------------<  129<H>  3.7   |  22  |  0.50    Ca    8.4      06 Jun 2017 07:58  Mg     1.9     06-06    TPro  6.8  /  Alb  3.3  /  TBili  0.7  /  DBili  x   /  AST  39  /  ALT  49<H>  /  AlkPhos  134<H>  06-05      Urinalysis Basic - ( 07 Jun 2017 00:58 )    Color: Yellow / Appearance: Clear / SG: <=1.005 / pH: x  Gluc: x / Ketone: NEGATIVE  / Bili: NEGATIVE / Urobili: 0.2 E.U./dL   Blood: x / Protein: NEGATIVE mg/dL / Nitrite: NEGATIVE   Leuk Esterase: NEGATIVE / RBC: x / WBC x   Sq Epi: x / Non Sq Epi: x / Bacteria: x        RADIOLOGY & ADDITIONAL TESTS:

## 2017-06-07 NOTE — PROGRESS NOTE ADULT - PROBLEM SELECTOR PLAN 1
-  Pt met Sepsis criteria overnight secondary to LLL PNA. s/p 1L NS bolus in the ED and 500cc NS bolus overnight.   - c/t trend CBC, monitor vitals  - Will c/w NS maintenance in the setting of poor PO intake  - c/w Tylenol PRN for T>100.4 Pt met Sepsis criteria overnight secondary to LLL PNA. s/p 1L NS bolus in the ED and 500cc NS bolus overnight.   - c/t trend CBC, monitor vitals  - Will c/w NS maintenance in the setting of poor PO intake  - c/w Tylenol PRN for T>100.4 Pt met Sepsis criteria overnight secondary to LLL PNA (fever and HR >90). s/p 1L NS bolus in the ED and 500cc NS bolus overnight.   - c/t trend CBC, monitor vitals  - Will c/w NS maintenance in the setting of poor PO intake  - c/w Tylenol PRN for T>100.4 Pt met Sepsis criteria overnight secondary to LLL PNA (fever and HR >90). s/p 1L NS bolus in the ED    - c/t trend CBC, monitor vitals  - Will c/w NS maintenance in the setting of poor PO intake  - c/w Tylenol PRN for T>100.4

## 2017-06-08 LAB
BASOPHILS NFR BLD AUTO: 0.2 % — SIGNIFICANT CHANGE UP (ref 0–2)
EOSINOPHIL NFR BLD AUTO: 0.6 % — SIGNIFICANT CHANGE UP (ref 0–6)
HCT VFR BLD CALC: 33.3 % — LOW (ref 34.5–45)
HCV AB S/CO SERPL IA: 0.1 S/CO — SIGNIFICANT CHANGE UP
HCV AB SERPL-IMP: SIGNIFICANT CHANGE UP
HGB BLD-MCNC: 11.5 G/DL — SIGNIFICANT CHANGE UP (ref 11.5–15.5)
LEGIONELLA AG UR QL: NEGATIVE — SIGNIFICANT CHANGE UP
LYMPHOCYTES # BLD AUTO: 4.8 % — LOW (ref 13–44)
MCHC RBC-ENTMCNC: 29.7 PG — SIGNIFICANT CHANGE UP (ref 27–34)
MCHC RBC-ENTMCNC: 34.5 G/DL — SIGNIFICANT CHANGE UP (ref 32–36)
MCV RBC AUTO: 86 FL — SIGNIFICANT CHANGE UP (ref 80–100)
MONOCYTES NFR BLD AUTO: 2.2 % — SIGNIFICANT CHANGE UP (ref 2–14)
NEUTROPHILS NFR BLD AUTO: 92.2 % — HIGH (ref 43–77)
PLATELET # BLD AUTO: 284 K/UL — SIGNIFICANT CHANGE UP (ref 150–400)
RBC # BLD: 3.87 M/UL — SIGNIFICANT CHANGE UP (ref 3.8–5.2)
RBC # FLD: 14.1 % — SIGNIFICANT CHANGE UP (ref 10.3–16.9)
WBC # BLD: 8.3 K/UL — SIGNIFICANT CHANGE UP (ref 3.8–10.5)
WBC # FLD AUTO: 8.3 K/UL — SIGNIFICANT CHANGE UP (ref 3.8–10.5)

## 2017-06-08 PROCEDURE — 99233 SBSQ HOSP IP/OBS HIGH 50: CPT

## 2017-06-08 PROCEDURE — 93010 ELECTROCARDIOGRAM REPORT: CPT

## 2017-06-08 PROCEDURE — 99223 1ST HOSP IP/OBS HIGH 75: CPT | Mod: GC

## 2017-06-08 RX ORDER — ACETAMINOPHEN 500 MG
1000 TABLET ORAL ONCE
Qty: 0 | Refills: 0 | Status: COMPLETED | OUTPATIENT
Start: 2017-06-08 | End: 2017-06-08

## 2017-06-08 RX ORDER — SODIUM CHLORIDE 9 MG/ML
1000 INJECTION INTRAMUSCULAR; INTRAVENOUS; SUBCUTANEOUS ONCE
Qty: 0 | Refills: 0 | Status: DISCONTINUED | OUTPATIENT
Start: 2017-06-08 | End: 2017-06-08

## 2017-06-08 RX ORDER — LACTOBACILLUS ACIDOPHILUS 100MM CELL
1 CAPSULE ORAL DAILY
Qty: 0 | Refills: 0 | Status: DISCONTINUED | OUTPATIENT
Start: 2017-06-08 | End: 2017-06-08

## 2017-06-08 RX ORDER — ACETAMINOPHEN 500 MG
650 TABLET ORAL EVERY 6 HOURS
Qty: 0 | Refills: 0 | Status: COMPLETED | OUTPATIENT
Start: 2017-06-08 | End: 2017-06-09

## 2017-06-08 RX ORDER — SODIUM CHLORIDE 9 MG/ML
500 INJECTION INTRAMUSCULAR; INTRAVENOUS; SUBCUTANEOUS ONCE
Qty: 0 | Refills: 0 | Status: COMPLETED | OUTPATIENT
Start: 2017-06-08 | End: 2017-06-08

## 2017-06-08 RX ORDER — LACTOBACILLUS ACIDOPHILUS 100MM CELL
1 CAPSULE ORAL EVERY 8 HOURS
Qty: 0 | Refills: 0 | Status: DISCONTINUED | OUTPATIENT
Start: 2017-06-08 | End: 2017-06-12

## 2017-06-08 RX ADMIN — PIPERACILLIN AND TAZOBACTAM 200 GRAM(S): 4; .5 INJECTION, POWDER, LYOPHILIZED, FOR SOLUTION INTRAVENOUS at 01:10

## 2017-06-08 RX ADMIN — PIPERACILLIN AND TAZOBACTAM 200 GRAM(S): 4; .5 INJECTION, POWDER, LYOPHILIZED, FOR SOLUTION INTRAVENOUS at 06:23

## 2017-06-08 RX ADMIN — HEPARIN SODIUM 5000 UNIT(S): 5000 INJECTION INTRAVENOUS; SUBCUTANEOUS at 14:10

## 2017-06-08 RX ADMIN — Medication 650 MILLIGRAM(S): at 18:14

## 2017-06-08 RX ADMIN — SODIUM CHLORIDE 2000 MILLILITER(S): 9 INJECTION INTRAMUSCULAR; INTRAVENOUS; SUBCUTANEOUS at 05:35

## 2017-06-08 RX ADMIN — HEPARIN SODIUM 5000 UNIT(S): 5000 INJECTION INTRAVENOUS; SUBCUTANEOUS at 21:47

## 2017-06-08 RX ADMIN — PIPERACILLIN AND TAZOBACTAM 200 GRAM(S): 4; .5 INJECTION, POWDER, LYOPHILIZED, FOR SOLUTION INTRAVENOUS at 17:26

## 2017-06-08 RX ADMIN — PIPERACILLIN AND TAZOBACTAM 200 GRAM(S): 4; .5 INJECTION, POWDER, LYOPHILIZED, FOR SOLUTION INTRAVENOUS at 12:46

## 2017-06-08 RX ADMIN — Medication 650 MILLIGRAM(S): at 14:53

## 2017-06-08 RX ADMIN — Medication 1 TABLET(S): at 19:31

## 2017-06-08 RX ADMIN — Medication 650 MILLIGRAM(S): at 05:00

## 2017-06-08 RX ADMIN — HEPARIN SODIUM 5000 UNIT(S): 5000 INJECTION INTRAVENOUS; SUBCUTANEOUS at 06:23

## 2017-06-08 RX ADMIN — Medication 400 MILLIGRAM(S): at 06:23

## 2017-06-08 RX ADMIN — Medication 250 MILLIGRAM(S): at 13:43

## 2017-06-08 RX ADMIN — Medication 250 MILLIGRAM(S): at 01:58

## 2017-06-08 RX ADMIN — Medication 650 MILLIGRAM(S): at 23:52

## 2017-06-08 RX ADMIN — PANTOPRAZOLE SODIUM 40 MILLIGRAM(S): 20 TABLET, DELAYED RELEASE ORAL at 06:23

## 2017-06-08 NOTE — PHYSICAL THERAPY INITIAL EVALUATION ADULT - GAIT DEVIATIONS NOTED, PT EVAL
mild unsteadiness, decreased gait speed, postural sway/decreased stride length/decreased step length

## 2017-06-08 NOTE — PROGRESS NOTE ADULT - ATTENDING COMMENTS
Pt. seen and examined at the bedside this am by me.  Agree with Dr. Frankel's findings, assessment and plan as stated above.  The patient has improved overnight.  Fever curve down, O2 requirements less.  Has CAP - mulilobar.  PT evaluation appreciated.  OOB with O2 saturation evaluation.  Start probiotics.  If not needed O2 tomorrow, will d/c on Levaquin 750 to complete course if pulmonary agrees.  Pulm consult greatly appreciated.

## 2017-06-08 NOTE — PROGRESS NOTE ADULT - SUBJECTIVE AND OBJECTIVE BOX
INTERVAL HPI/OVERNIGHT EVENTS:  Patient was seen and examined at bedside. CT scan done overnight, with patient having tmax 101 and rigors. Given IV tylenol and broadened to vancomycin and zosyn, levaquin kept for double pseudomonal and atypical coverage.     VITAL SIGNS:  T(F): 101  HR: 112  BP: 125/72  RR: 20  SpO2: 92%  Wt(kg): --    PHYSICAL EXAM:    Constitutional: WDWN, NAD  Eyes: PERRL, EOMI, sclera non-icteric  Neck: supple, trachea midline, no masses, no JVD  Respiratory: Bronchial breath sounds, egophony on left lower lobe, crackles more prominant right lower lobe.   Cardiovascular: RRR, normal S1S2, no M/R/G  Gastrointestinal: soft, NTND, no masses palpable, BS normal  Extremities: Warm, well perfused, pulses equal bilateral upper and lower extremities, no edema, no clubbing; difficulty moving right leg.   Neurological: AAOx3, CN Grossly intact  Skin: Normal temperature, warm, dry    MEDICATIONS  (STANDING):  heparin  Injectable 5000Unit(s) SubCutaneous every 8 hours  pantoprazole    Tablet 40milliGRAM(s) Oral before breakfast  freetext medication  - 1Vial(s) IntraDermal once  vancomycin  IVPB  IV Intermittent   piperacillin/tazobactam IVPB.  IV Intermittent   piperacillin/tazobactam IVPB. 4.5Gram(s) IV Intermittent every 6 hours  vancomycin  IVPB 1000milliGRAM(s) IV Intermittent every 12 hours  levoFLOXacin  Tablet 750milliGRAM(s) Oral every 24 hours    MEDICATIONS  (PRN):  ALBUTerol/ipratropium for Nebulization 3milliLiter(s) Nebulizer every 4 hours PRN Shortness of Breath and/or Wheezing  guaiFENesin/dextromethorphan  Syrup 10milliLiter(s) Oral every 6 hours PRN Cough  acetaminophen   Tablet 650milliGRAM(s) Oral every 6 hours PRN For Temp greater than 38 C (100.4 F)      Allergies    No Known Allergies    Intolerances        LABS:                        10.8   9.3   )-----------( 236      ( 07 Jun 2017 07:44 )             31.7     06-07    138  |  102  |  5<L>  ----------------------------<  116<H>  3.4<L>   |  24  |  0.50    Ca    8.6      07 Jun 2017 07:43  Mg     1.8     06-07        Urinalysis Basic - ( 07 Jun 2017 00:58 )    Color: Yellow / Appearance: Clear / SG: <=1.005 / pH: x  Gluc: x / Ketone: NEGATIVE  / Bili: NEGATIVE / Urobili: 0.2 E.U./dL   Blood: x / Protein: NEGATIVE mg/dL / Nitrite: NEGATIVE   Leuk Esterase: NEGATIVE / RBC: x / WBC x   Sq Epi: x / Non Sq Epi: x / Bacteria: x        RADIOLOGY & ADDITIONAL TESTS:

## 2017-06-08 NOTE — PHYSICAL THERAPY INITIAL EVALUATION ADULT - PERTINENT HX OF CURRENT PROBLEM, REHAB EVAL
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 Pt recently returned [place] on Tuesday last week and started to develop non-productive cough on Thursday, with fevers up to 103 at home.

## 2017-06-08 NOTE — PROGRESS NOTE ADULT - PROBLEM SELECTOR PLAN 1
Pt met Sepsis criteria overnight secondary to LLL PNA (fever and HR >90). s/p 500cc bolus overnight. Patient reports having ?viral illness, self treated with tamiflu then symptoms of cough,   - c/t trend CBC, monitor vitals  - Will c/w NS maintenance in the setting of poor PO intake  - c/w Tylenol PRN for T>100.4 OVerall fever curve trending down, though still spiking and still having fevers. Pt with no recent hospitalization or recent use of IV Abx (only took 1 dose of Azithromycin), reports rash with Penicillin.  RVP negative. Left base consolidation.  - c/w Levaquin 750mg PO daily for 5 day course (day 4/5)  - c/w oxygen therapy with NC as needed, SpO2 %, 2LNC  - f/u blood cx: No growth to date  -Overnight fever to 101, with rigors so broadened to vancomycin and zosyn; CT scan showing evoloving LLL pneumonia likely now multifocal. Patient low suspicion for hap, more likely CAP, multilobar; will try to  walk patient today to see if desaturates; if OK, will titrate of oxygen and pending no rising fever curve, tomorrow should be last day of abx; will de-escalate vanc and zosyn. Levaquin kept on for atypical coverage as well.

## 2017-06-08 NOTE — CONSULT NOTE ADULT - PROBLEM SELECTOR RECOMMENDATION 9
The condition is consistent with viral illness complicated by multilobar pneumonia.  The condition is stable with decrease in the temperature curve.  Blood cultures are negative.  No sputum.  Follow on mycoplasm titers, and urine for legionella antigen.  Continue current antibiotics, follow on cultures and serology, and descalate in the ext 24 hours.  OOB.  Avoid fluid overload.  ECHO unremarkable.  US of chest to rule out pleural effusions tomorrow

## 2017-06-08 NOTE — PROGRESS NOTE ADULT - PROBLEM SELECTOR PLAN 2
Pt with no recent hospitalization or recent use of IV Abx (only took 1 dose of Azithromycin), reports rash with Penicillin.  RVP negative. Left base consolidation.  - c/w Levaquin 750mg PO daily for 5 day course (day 4/5)  - c/w oxygen therapy with NC as needed, SpO2 %, though pt reports some chest tightness, so was placed on 2L NC  - Tylenol PRN for fever  - f/u blood cx: No growth after x1 day  -Overnight fever to 101, with rigors Not on any meds, stable without pain.

## 2017-06-08 NOTE — CHART NOTE - NSCHARTNOTEFT_GEN_A_CORE
PGY-1 EVENT NOTE    Notified by nurse that patient had fever spike to 101 w/ HR - 112. Evaluated patient at bedside.    VITAL SIGNS:  T(C): 38, Max: 38.3 (06-08 @ 05:09)  T(F): 100.4, Max: 101 (06-08 @ 05:09)  HR: 94 (84 - 112)  BP: 118/62 (106/70 - 125/72)  BP(mean): --  RR: 18 (18 - 25)  SpO2: 94% (92% - 95%)  Wt(kg): --    PHYSICAL EXAM:    Constitutional: WDWN, rigors  Head: NC/AT  Eyes: PERRL, EOMI, anicteric sclera  ENT: dry MM  Neck: no JVD  Respiratory: Poor inspiratory effort, decreased breath sounds at LLB w/fine crackles  Cardiac: +S1/S2; tachycardic, regular rhythm  Gastrointestinal: soft, NT/ND; no rebound or guarding; +BSx4  Musculoskeletal: NROM x4; no joint swelling, tenderness or erythema  Neurologic: AAOx3; CNII-XII grossly intact; no focal deficits    A/P:  - patient cultured within last 24 hours; will defer cultures  - f/u AM CBC  - given history of pulmonary edema, will be cautious with fluids. 500 cc bolus ordered  - c/w vancomycin + zosyn

## 2017-06-08 NOTE — PROGRESS NOTE ADULT - PROBLEM SELECTOR PLAN 4
Regular diet  Replete lytes PRN  c/w IVF HSQ  FULL CODE  Dispo: Likely discharge tomorrow pending continued clinical improvement.

## 2017-06-08 NOTE — CONSULT NOTE ADULT - SUBJECTIVE AND OBJECTIVE BOX
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 Pt recently returned [place] on Tuesday last week and started to develop non-productive cough on Thursday, with fevers up to 103 at home. She started taking Tamiflu without improvement, so took one dose of Azithromycin yesterday. She started to feel more SOB today with some chest tightness, so decided to come to the ED. Pt denies chills/rigors, recent weight loss, night sweats, runny nose, watery eyes, sore throat, wheezing, neck stiffness, photophobia, chest pain, palpitations, abdominal pain, n/v/d/c, dysuria. Pt denies any sick contacts, animal contacts, recent hospitalization or Abx use (except 1 dose of azithro). In ED, T 101. HR 93, /63, RR 17-20, [id number]% on RA/2L NC. Labs significant for WBC 10.8 with 89.2 Neutrophil, 26% Band, lactate wnl (1.0), CXR significant for LLL consolidation. Pt received 1L NS, Duoneb x1, Tylenol 975mg x1. (05 Jun 2017 17:25)      PAST MEDICAL & SURGICAL HISTORY:  Fibromyalgia  No significant past surgical history      FAMILY HISTORY:  No pertinent family history in first degree relatives      SOCIAL HISTORY:  Smoking Status: [ ] Current, [ ] Former, [ ] Never  Pack Years:    MEDICATIONS:  Pulmonary:  ALBUTerol/ipratropium for Nebulization 3milliLiter(s) Nebulizer every 4 hours PRN  guaiFENesin/dextromethorphan  Syrup 10milliLiter(s) Oral every 6 hours PRN    Antimicrobials:  vancomycin  IVPB  IV Intermittent   piperacillin/tazobactam IVPB.  IV Intermittent   piperacillin/tazobactam IVPB. 4.5Gram(s) IV Intermittent every 6 hours  vancomycin  IVPB 1000milliGRAM(s) IV Intermittent every 12 hours  levoFLOXacin  Tablet 750milliGRAM(s) Oral every 24 hours    Anticoagulants:  heparin  Injectable 5000Unit(s) SubCutaneous every 8 hours    Onc:    GI/:  pantoprazole    Tablet 40milliGRAM(s) Oral before breakfast    Endocrine:    Cardiac:    Other Medications:  acetaminophen   Tablet 650milliGRAM(s) Oral every 6 hours PRN      Allergies    No Known Allergies    Intolerances        Vital Signs Last 24 Hrs  T(C): 38.3, Max: 38.3 (06-08 @ 05:09)  T(F): 101, Max: 101 (06-08 @ 05:09)  HR: 112 (84 - 112)  BP: 125/72 (106/70 - 125/72)  BP(mean): --  RR: 20 (20 - 25)  SpO2: 92% (92% - 95%)    I & Os for current day (as of 06-08 @ 08:27)  =============================================  IN: 1550 ml / OUT: 3100 ml / NET: -1550 ml        LABS:      CBC Full  -  ( 08 Jun 2017 08:03 )  WBC Count : 8.3 K/uL  Hemoglobin : 11.5 g/dL  Hematocrit : 33.3 %  Platelet Count - Automated : 284 K/uL  Mean Cell Volume : 86.0 fL  Mean Cell Hemoglobin : 29.7 pg  Mean Cell Hemoglobin Concentration : 34.5 g/dL  Auto Neutrophil # : x  Auto Lymphocyte # : x  Auto Monocyte # : x  Auto Eosinophil # : x  Auto Basophil # : x  Auto Neutrophil % : 92.2 %  Auto Lymphocyte % : 4.8 %  Auto Monocyte % : 2.2 %  Auto Eosinophil % : 0.6 %  Auto Basophil % : 0.2 %    06-07    138  |  102  |  5<L>  ----------------------------<  116<H>  3.4<L>   |  24  |  0.50    Ca    8.6      07 Jun 2017 07:43  Mg     1.8     06-07            Urinalysis Basic - ( 07 Jun 2017 00:58 )    Color: Yellow / Appearance: Clear / SG: <=1.005 / pH: x  Gluc: x / Ketone: NEGATIVE  / Bili: NEGATIVE / Urobili: 0.2 E.U./dL   Blood: x / Protein: NEGATIVE mg/dL / Nitrite: NEGATIVE   Leuk Esterase: NEGATIVE / RBC: x / WBC x   Sq Epi: x / Non Sq Epi: x / Bacteria: x          CT scan of chest multilobar pneumonia in the RLL, LEA, and LLL        RADIOLOGY & ADDITIONAL STUDIES (The following images were personally reviewed):

## 2017-06-08 NOTE — DIETITIAN INITIAL EVALUATION ADULT. - OTHER INFO
skin - intact , no pain , no n/v/d/c , pta - follows no rest diet , skin - intact , no food allergys

## 2017-06-08 NOTE — PHYSICAL THERAPY INITIAL EVALUATION ADULT - ADDITIONAL COMMENTS
Patient lives in elevator apartment, no steps to enter. Patient denies DME or history of falls. Patient reports exercising 4 times a week and receiving outpatient PT for shoulder and low back pain.

## 2017-06-08 NOTE — PHYSICAL THERAPY INITIAL EVALUATION ADULT - DIAGNOSIS, PT EVAL
5A: Primary Prevention/Risk Reduction for Loss of Balance and Falling, 6B: Impaired Aerobic Capacity/Endurance Associated with Deconditioning

## 2017-06-09 DIAGNOSIS — R19.7 DIARRHEA, UNSPECIFIED: ICD-10-CM

## 2017-06-09 LAB
ANION GAP SERPL CALC-SCNC: 13 MMOL/L — SIGNIFICANT CHANGE UP (ref 5–17)
BUN SERPL-MCNC: 6 MG/DL — LOW (ref 7–23)
C DIFF GDH STL QL: NEGATIVE — SIGNIFICANT CHANGE UP
C DIFF GDH STL QL: SIGNIFICANT CHANGE UP
CALCIUM SERPL-MCNC: 8.8 MG/DL — SIGNIFICANT CHANGE UP (ref 8.4–10.5)
CHLORIDE SERPL-SCNC: 101 MMOL/L — SIGNIFICANT CHANGE UP (ref 96–108)
CO2 SERPL-SCNC: 26 MMOL/L — SIGNIFICANT CHANGE UP (ref 22–31)
CREAT SERPL-MCNC: 0.6 MG/DL — SIGNIFICANT CHANGE UP (ref 0.5–1.3)
GLUCOSE SERPL-MCNC: 120 MG/DL — HIGH (ref 70–99)
LEGIONELLA AG UR QL: NEGATIVE — SIGNIFICANT CHANGE UP
M PNEUMO IGG SER IA-ACNC: 1.61 INDEX — HIGH
M PNEUMO IGG SER IA-ACNC: POSITIVE
M PNEUMO IGM SER-ACNC: 52 UNITS/ML — SIGNIFICANT CHANGE UP
MAGNESIUM SERPL-MCNC: 1.9 MG/DL — SIGNIFICANT CHANGE UP (ref 1.6–2.6)
MYCOPLASMA AG SPEC QL: NEGATIVE — SIGNIFICANT CHANGE UP
POTASSIUM SERPL-MCNC: 3.7 MMOL/L — SIGNIFICANT CHANGE UP (ref 3.5–5.3)
POTASSIUM SERPL-SCNC: 3.7 MMOL/L — SIGNIFICANT CHANGE UP (ref 3.5–5.3)
SODIUM SERPL-SCNC: 140 MMOL/L — SIGNIFICANT CHANGE UP (ref 135–145)

## 2017-06-09 PROCEDURE — 99233 SBSQ HOSP IP/OBS HIGH 50: CPT | Mod: GC

## 2017-06-09 PROCEDURE — 99232 SBSQ HOSP IP/OBS MODERATE 35: CPT | Mod: GC

## 2017-06-09 RX ORDER — ACETYLCYSTEINE 200 MG/ML
5 VIAL (ML) MISCELLANEOUS EVERY 6 HOURS
Qty: 0 | Refills: 0 | Status: DISCONTINUED | OUTPATIENT
Start: 2017-06-09 | End: 2017-06-09

## 2017-06-09 RX ORDER — ACETYLCYSTEINE 200 MG/ML
4 VIAL (ML) MISCELLANEOUS EVERY 6 HOURS
Qty: 0 | Refills: 0 | Status: DISCONTINUED | OUTPATIENT
Start: 2017-06-09 | End: 2017-06-12

## 2017-06-09 RX ORDER — POTASSIUM CHLORIDE 20 MEQ
40 PACKET (EA) ORAL ONCE
Qty: 0 | Refills: 0 | Status: COMPLETED | OUTPATIENT
Start: 2017-06-09 | End: 2017-06-09

## 2017-06-09 RX ORDER — ACETAMINOPHEN 500 MG
650 TABLET ORAL EVERY 6 HOURS
Qty: 0 | Refills: 0 | Status: DISCONTINUED | OUTPATIENT
Start: 2017-06-09 | End: 2017-06-12

## 2017-06-09 RX ADMIN — Medication 1 TABLET(S): at 21:23

## 2017-06-09 RX ADMIN — PANTOPRAZOLE SODIUM 40 MILLIGRAM(S): 20 TABLET, DELAYED RELEASE ORAL at 06:04

## 2017-06-09 RX ADMIN — HEPARIN SODIUM 5000 UNIT(S): 5000 INJECTION INTRAVENOUS; SUBCUTANEOUS at 13:42

## 2017-06-09 RX ADMIN — Medication 4 MILLILITER(S): at 18:14

## 2017-06-09 RX ADMIN — Medication 40 MILLIEQUIVALENT(S): at 11:45

## 2017-06-09 RX ADMIN — Medication 250 MILLIGRAM(S): at 01:43

## 2017-06-09 RX ADMIN — Medication 650 MILLIGRAM(S): at 06:04

## 2017-06-09 RX ADMIN — PIPERACILLIN AND TAZOBACTAM 200 GRAM(S): 4; .5 INJECTION, POWDER, LYOPHILIZED, FOR SOLUTION INTRAVENOUS at 00:01

## 2017-06-09 RX ADMIN — Medication 3 MILLILITER(S): at 11:08

## 2017-06-09 RX ADMIN — Medication 1 TABLET(S): at 13:42

## 2017-06-09 RX ADMIN — Medication 1 TABLET(S): at 06:04

## 2017-06-09 RX ADMIN — HEPARIN SODIUM 5000 UNIT(S): 5000 INJECTION INTRAVENOUS; SUBCUTANEOUS at 06:04

## 2017-06-09 RX ADMIN — Medication 650 MILLIGRAM(S): at 11:44

## 2017-06-09 RX ADMIN — PIPERACILLIN AND TAZOBACTAM 200 GRAM(S): 4; .5 INJECTION, POWDER, LYOPHILIZED, FOR SOLUTION INTRAVENOUS at 06:04

## 2017-06-09 NOTE — PROGRESS NOTE ADULT - PROBLEM SELECTOR PLAN 4
HSQ  FULL CODE  Dispo: Likely discharge tomorrow pending continued clinical improvement. Regular diet  Replete lytes PRN  c/w IVF

## 2017-06-09 NOTE — PROGRESS NOTE ADULT - PROBLEM SELECTOR PLAN 1
she is clinically improving.  temperature curve improved.  DC vanco and zosyn continue  leva kerrie await mycoplasma titers.  legionella  antigen was negative.  OOB .  US of chest to ruke out effusion.  Sat on room air is normal

## 2017-06-09 NOTE — PROGRESS NOTE ADULT - SUBJECTIVE AND OBJECTIVE BOX
INTERVAL HPI/OVERNIGHT EVENTS:  Patient was seen and examined at bedside. As per nurse and patient, no o/n events, patient resting comfortably. Patient feels  better, is off oxygen. No dizziness, chest pain, +still with some sob though her tolerance has improved for activity. +weak/fatigue. Had 4 loose bowel movements prior eevning, placed on contact and stool studies for c.difficile sent.     VITAL SIGNS:  T(F): 97.6  HR: 80  BP: 101/69  RR: 18  SpO2: 98%  Wt(kg): --    PHYSICAL EXAM:    Constitutional: WDWN, NAD  Eyes: PERRL, EOMI, sclera non-icteric  Neck: supple, trachea midline, no masses, no JVD  Respiratory: Bronchial breath sounds, egophony on left lower lobe, +crackles RLL   Cardiovascular: RRR, normal S1S2, no M/R/G  Gastrointestinal: soft, NTND, no masses palpable, BS normal  Extremities: Warm, well perfused, pulses equal bilateral upper and lower extremities, no edema, no clubbing; difficulty moving right leg.   Neurological: AAOx3, CN Grossly intact  Skin: Normal temperature, warm, dry    MEDICATIONS  (STANDING):  heparin  Injectable 5000Unit(s) SubCutaneous every 8 hours  pantoprazole    Tablet 40milliGRAM(s) Oral before breakfast  freetext medication  - 1Vial(s) IntraDermal once  levoFLOXacin  Tablet 750milliGRAM(s) Oral every 24 hours  acetaminophen   Tablet 650milliGRAM(s) Oral every 6 hours  lactobacillus acidophilus 1Tablet(s) Oral every 8 hours  potassium chloride    Tablet ER 40milliEquivalent(s) Oral once    MEDICATIONS  (PRN):  ALBUTerol/ipratropium for Nebulization 3milliLiter(s) Nebulizer every 4 hours PRN Shortness of Breath and/or Wheezing  guaiFENesin/dextromethorphan  Syrup 10milliLiter(s) Oral every 6 hours PRN Cough      Allergies    No Known Allergies    Intolerances        LABS:                        11.5   8.3   )-----------( 284      ( 08 Jun 2017 08:03 )             33.3     06-09    140  |  101  |  6<L>  ----------------------------<  120<H>  3.7   |  26  |  0.60    Ca    8.8      09 Jun 2017 07:27  Mg     1.9     06-09            RADIOLOGY & ADDITIONAL TESTS: INTERVAL HPI/OVERNIGHT EVENTS:  Patient was seen and examined at bedside. As per nurse and patient, no o/n events, patient resting comfortably. Patient feels  better, is off oxygen. No dizziness, chest pain, +still with some sob though her tolerance has improved for activity. +weak/fatigue. Had 4 loose bowel movements prior eevning, placed on contact and stool studies for c.difficile sent.     VITAL SIGNS:  T(F): 97.6  HR: 80  BP: 101/69  RR: 18  SpO2: 98%  Wt(kg): --    PHYSICAL EXAM:    Constitutional: WDWN, NAD  Eyes: PERRL, EOMI, sclera non-icteric  Neck: supple, trachea midline, no masses, no JVD  Respiratory: Bronchial breath sounds, egophony on left lower lobe, +crackles RLL>LLL   Cardiovascular: RRR, normal S1S2, no M/R/G  Gastrointestinal: soft, NTND, no masses palpable, BS normal  Extremities: Warm, well perfused, pulses equal bilateral upper and lower extremities, no edema, no clubbing; difficulty moving right leg.   Neurological: AAOx3, CN Grossly intact  Skin: Normal temperature, warm, dry    MEDICATIONS  (STANDING):  heparin  Injectable 5000Unit(s) SubCutaneous every 8 hours  pantoprazole    Tablet 40milliGRAM(s) Oral before breakfast  freetext medication  - 1Vial(s) IntraDermal once  levoFLOXacin  Tablet 750milliGRAM(s) Oral every 24 hours  acetaminophen   Tablet 650milliGRAM(s) Oral every 6 hours  lactobacillus acidophilus 1Tablet(s) Oral every 8 hours  potassium chloride    Tablet ER 40milliEquivalent(s) Oral once    MEDICATIONS  (PRN):  ALBUTerol/ipratropium for Nebulization 3milliLiter(s) Nebulizer every 4 hours PRN Shortness of Breath and/or Wheezing  guaiFENesin/dextromethorphan  Syrup 10milliLiter(s) Oral every 6 hours PRN Cough      Allergies    No Known Allergies    Intolerances        LABS:                        11.5   8.3   )-----------( 284      ( 08 Jun 2017 08:03 )             33.3     06-09    140  |  101  |  6<L>  ----------------------------<  120<H>  3.7   |  26  |  0.60    Ca    8.8      09 Jun 2017 07:27  Mg     1.9     06-09            RADIOLOGY & ADDITIONAL TESTS: INTERVAL HPI/OVERNIGHT EVENTS:  Patient was seen and examined at bedside. As per nurse and patient, no o/n events, patient resting comfortably. Patient feels  better, is off oxygen. No dizziness, chest pain, +still with some sob though her tolerance has improved for activity. +weak/fatigue. Had 4 loose bowel movements prior evening, placed on contact and stool studies for c.difficile sent.     VITAL SIGNS:  T(F): 97.6  HR: 80  BP: 101/69  RR: 18  SpO2: 98%  Wt(kg): --    PHYSICAL EXAM:    Constitutional: WDWN, NAD  Eyes: PERRL, EOMI, sclera non-icteric  Neck: supple, trachea midline, no masses, no JVD  Respiratory: Bronchial breath sounds, egophony on left lower lobe, +crackles RLL>LLL   Cardiovascular: RRR, normal S1S2, no M/R/G  Gastrointestinal: soft, NTND, no masses palpable, BS normal  Extremities: Warm, well perfused, pulses equal bilateral upper and lower extremities, no edema, no clubbing; difficulty moving right leg.   Neurological: AAOx3, CN Grossly intact  Skin: Normal temperature, warm, dry    MEDICATIONS  (STANDING):  heparin  Injectable 5000Unit(s) SubCutaneous every 8 hours  pantoprazole    Tablet 40milliGRAM(s) Oral before breakfast  freetext medication  - 1Vial(s) IntraDermal once  levoFLOXacin  Tablet 750milliGRAM(s) Oral every 24 hours  acetaminophen   Tablet 650milliGRAM(s) Oral every 6 hours  lactobacillus acidophilus 1Tablet(s) Oral every 8 hours  potassium chloride    Tablet ER 40milliEquivalent(s) Oral once    MEDICATIONS  (PRN):  ALBUTerol/ipratropium for Nebulization 3milliLiter(s) Nebulizer every 4 hours PRN Shortness of Breath and/or Wheezing  guaiFENesin/dextromethorphan  Syrup 10milliLiter(s) Oral every 6 hours PRN Cough      Allergies    No Known Allergies    Intolerances        LABS:                        11.5   8.3   )-----------( 284      ( 08 Jun 2017 08:03 )             33.3     06-09    140  |  101  |  6<L>  ----------------------------<  120<H>  3.7   |  26  |  0.60    Ca    8.8      09 Jun 2017 07:27  Mg     1.9     06-09            RADIOLOGY & ADDITIONAL TESTS:

## 2017-06-09 NOTE — PROGRESS NOTE ADULT - SUBJECTIVE AND OBJECTIVE BOX
Interval Events: reviewed  Patient seen and examined at bedside.    Patient is a 61y old  Female who presents with a chief complaint of Shortness of breath (05 Jun 2017 18:29)    she is doing better still has a cough and it hurts when she takes a deep breath  PAST MEDICAL & SURGICAL HISTORY:  Fibromyalgia  No significant past surgical history      MEDICATIONS:  Pulmonary:  ALBUTerol/ipratropium for Nebulization 3milliLiter(s) Nebulizer every 4 hours PRN  guaiFENesin/dextromethorphan  Syrup 10milliLiter(s) Oral every 6 hours PRN    Antimicrobials:  levoFLOXacin  Tablet 750milliGRAM(s) Oral every 24 hours    Anticoagulants:  heparin  Injectable 5000Unit(s) SubCutaneous every 8 hours    Cardiac:      Allergies    No Known Allergies    Intolerances        Vital Signs Last 24 Hrs  T(C): 37.3, Max: 38 (06-08 @ 07:50)  T(F): 99.1, Max: 100.4 (06-08 @ 07:50)  HR: 76 (74 - 94)  BP: 112/74 (89/56 - 118/62)  BP(mean): --  RR: 19 (18 - 26)  SpO2: 97% (94% - 97%)    I & Os for current day (as of 06-09 @ 07:36)  =============================================  IN: 350 ml / OUT: 3100 ml / NET: -2750 ml        LABS:      CBC Full  -  ( 08 Jun 2017 08:03 )  WBC Count : 8.3 K/uL  Hemoglobin : 11.5 g/dL  Hematocrit : 33.3 %  Platelet Count - Automated : 284 K/uL  Mean Cell Volume : 86.0 fL  Mean Cell Hemoglobin : 29.7 pg  Mean Cell Hemoglobin Concentration : 34.5 g/dL  Auto Neutrophil # : x  Auto Lymphocyte # : x  Auto Monocyte # : x  Auto Eosinophil # : x  Auto Basophil # : x  Auto Neutrophil % : 92.2 %  Auto Lymphocyte % : 4.8 %  Auto Monocyte % : 2.2 %  Auto Eosinophil % : 0.6 %  Auto Basophil % : 0.2 %    06-07    138  |  102  |  5<L>  ----------------------------<  116<H>  3.4<L>   |  24  |  0.50    Ca    8.6      07 Jun 2017 07:43  Mg     1.8     06-07                      Culture Results:   No growth at 1 day. (06-07 @ 15:49)      RADIOLOGY & ADDITIONAL STUDIES (The following images were personally reviewed):  Ortiz:                      No  Urine output:               Yes        DVT prophylaxis:         Yes         Flattus:                          Yes         Bowel movement:       Yes

## 2017-06-09 NOTE — PROGRESS NOTE ADULT - PROBLEM SELECTOR PLAN 2
Not on any meds, stable without pain. Resolved. 4 episodes loose stool previous evening. Placed on contact and ordered C.diff; patient with no further episodes overnight and formed stool this AM and no increase in WBC count or abdominal sx/signs  consistetn with infection. C.diff negative this PM.   -Discontinue isolation.

## 2017-06-09 NOTE — PROGRESS NOTE ADULT - PROBLEM SELECTOR PLAN 1
OVerall fever curve trending down, though still spiking and still having fevers. Pt with no recent hospitalization or recent use of IV Abx (only took 1 dose of Azithromycin), reports rash with Penicillin.  RVP negative. Left base consolidation.  - c/w Levaquin 750mg PO daily for 5 day course (day 4/5)  - c/w oxygen therapy with NC as needed, SpO2 %, 2LNC  - f/u blood cx: No growth to date  -Overnight fever to 101, with rigors so broadened to vancomycin and zosyn; CT scan showing evoloving LLL pneumonia likely now multifocal. Patient low suspicion for hap, more likely CAP, multilobar; will try to  walk patient today to see if desaturates; if OK, will titrate of oxygen and pending no rising fever curve, tomorrow should be last day of abx; will de-escalate vanc and zosyn. Levaquin kept on for atypical coverage as well. OVerall fever curve trending down, though still spiking and still having fevers. Pt with no recent hospitalization or recent use of IV Abx (only took 1 dose of Azithromycin), reports rash with Penicillin.  RVP negative. Left base consolidation.  - c/w Levaquin 750mg PO daily for 5 day course (day 5/5)  - c/w oxygen therapy with NC as needed, SpO2 %, 2LNC; patient not requiring oxygen, no desaturation when off; no desaturation when walking with PT  - f/u blood cx: No growth to date  - Vancomycin/zosyn discontinued per Dr. Montelongo (pulm); patient last day of levaquin for CAP; will check ultrasound for effusions; mycoplasma pending; patient potential for DC today if OK with pulm. Will f/u. Overall fever curve trending down, though still spiking and still having fevers. Pt with no recent hospitalization or recent use of IV Abx (only took 1 dose of Azithromycin), reports rash with Penicillin.  RVP negative. Left base consolidation.  - c/w Levaquin 750mg PO daily for 5 day course (day 5/5)  - c/w oxygen therapy with NC as needed, SpO2 %, 2LNC; patient not requiring oxygen, no desaturation when off; no desaturation when walking with PT  - f/u blood cx: No growth to date  - Vancomycin/zosyn discontinued per Dr. Montelongo (pulm); patient last day of levaquin for CAP; will check ultrasound for effusions; mycoplasma pending; patient potential for DC today if OK with pulm. Will f/u.

## 2017-06-10 LAB
ANION GAP SERPL CALC-SCNC: 13 MMOL/L — SIGNIFICANT CHANGE UP (ref 5–17)
APPEARANCE UR: CLEAR — SIGNIFICANT CHANGE UP
BILIRUB UR-MCNC: NEGATIVE — SIGNIFICANT CHANGE UP
BUN SERPL-MCNC: 7 MG/DL — SIGNIFICANT CHANGE UP (ref 7–23)
CALCIUM SERPL-MCNC: 9.3 MG/DL — SIGNIFICANT CHANGE UP (ref 8.4–10.5)
CHLORIDE SERPL-SCNC: 101 MMOL/L — SIGNIFICANT CHANGE UP (ref 96–108)
CO2 SERPL-SCNC: 24 MMOL/L — SIGNIFICANT CHANGE UP (ref 22–31)
COLOR SPEC: YELLOW — SIGNIFICANT CHANGE UP
CREAT SERPL-MCNC: 0.5 MG/DL — SIGNIFICANT CHANGE UP (ref 0.5–1.3)
CULTURE RESULTS: SIGNIFICANT CHANGE UP
CULTURE RESULTS: SIGNIFICANT CHANGE UP
DIFF PNL FLD: NEGATIVE — SIGNIFICANT CHANGE UP
GLUCOSE SERPL-MCNC: 118 MG/DL — HIGH (ref 70–99)
GLUCOSE UR QL: NEGATIVE — SIGNIFICANT CHANGE UP
HCT VFR BLD CALC: 34.1 % — LOW (ref 34.5–45)
HGB BLD-MCNC: 11.7 G/DL — SIGNIFICANT CHANGE UP (ref 11.5–15.5)
KETONES UR-MCNC: NEGATIVE — SIGNIFICANT CHANGE UP
LACTATE SERPL-SCNC: 1.3 MMOL/L — SIGNIFICANT CHANGE UP (ref 0.5–2)
LACTATE SERPL-SCNC: 2.1 MMOL/L — HIGH (ref 0.5–2)
LEGIONELLA AG UR QL: NEGATIVE — SIGNIFICANT CHANGE UP
LEUKOCYTE ESTERASE UR-ACNC: NEGATIVE — SIGNIFICANT CHANGE UP
MCHC RBC-ENTMCNC: 29.5 PG — SIGNIFICANT CHANGE UP (ref 27–34)
MCHC RBC-ENTMCNC: 34.3 G/DL — SIGNIFICANT CHANGE UP (ref 32–36)
MCV RBC AUTO: 85.9 FL — SIGNIFICANT CHANGE UP (ref 80–100)
NITRITE UR-MCNC: NEGATIVE — SIGNIFICANT CHANGE UP
PH UR: 5.5 — SIGNIFICANT CHANGE UP (ref 5–8)
PLATELET # BLD AUTO: 399 K/UL — SIGNIFICANT CHANGE UP (ref 150–400)
POTASSIUM SERPL-MCNC: 3.8 MMOL/L — SIGNIFICANT CHANGE UP (ref 3.5–5.3)
POTASSIUM SERPL-SCNC: 3.8 MMOL/L — SIGNIFICANT CHANGE UP (ref 3.5–5.3)
PROT UR-MCNC: NEGATIVE MG/DL — SIGNIFICANT CHANGE UP
RBC # BLD: 3.97 M/UL — SIGNIFICANT CHANGE UP (ref 3.8–5.2)
RBC # FLD: 13.7 % — SIGNIFICANT CHANGE UP (ref 10.3–16.9)
SODIUM SERPL-SCNC: 138 MMOL/L — SIGNIFICANT CHANGE UP (ref 135–145)
SP GR SPEC: <=1.005 — SIGNIFICANT CHANGE UP (ref 1–1.03)
SPECIMEN SOURCE: SIGNIFICANT CHANGE UP
SPECIMEN SOURCE: SIGNIFICANT CHANGE UP
UROBILINOGEN FLD QL: 0.2 E.U./DL — SIGNIFICANT CHANGE UP
WBC # BLD: 5.9 K/UL — SIGNIFICANT CHANGE UP (ref 3.8–10.5)
WBC # FLD AUTO: 5.9 K/UL — SIGNIFICANT CHANGE UP (ref 3.8–10.5)

## 2017-06-10 PROCEDURE — 99232 SBSQ HOSP IP/OBS MODERATE 35: CPT | Mod: GC

## 2017-06-10 PROCEDURE — 99233 SBSQ HOSP IP/OBS HIGH 50: CPT | Mod: GC

## 2017-06-10 PROCEDURE — 71010: CPT | Mod: 26

## 2017-06-10 RX ORDER — PIPERACILLIN AND TAZOBACTAM 4; .5 G/20ML; G/20ML
INJECTION, POWDER, LYOPHILIZED, FOR SOLUTION INTRAVENOUS
Qty: 0 | Refills: 0 | Status: DISCONTINUED | OUTPATIENT
Start: 2017-06-10 | End: 2017-06-12

## 2017-06-10 RX ORDER — SODIUM CHLORIDE 9 MG/ML
500 INJECTION INTRAMUSCULAR; INTRAVENOUS; SUBCUTANEOUS ONCE
Qty: 0 | Refills: 0 | Status: COMPLETED | OUTPATIENT
Start: 2017-06-10 | End: 2017-06-10

## 2017-06-10 RX ORDER — VANCOMYCIN HCL 1 G
1000 VIAL (EA) INTRAVENOUS ONCE
Qty: 0 | Refills: 0 | Status: COMPLETED | OUTPATIENT
Start: 2017-06-10 | End: 2017-06-10

## 2017-06-10 RX ORDER — PIPERACILLIN AND TAZOBACTAM 4; .5 G/20ML; G/20ML
4.5 INJECTION, POWDER, LYOPHILIZED, FOR SOLUTION INTRAVENOUS ONCE
Qty: 0 | Refills: 0 | Status: COMPLETED | OUTPATIENT
Start: 2017-06-10 | End: 2017-06-10

## 2017-06-10 RX ORDER — PIPERACILLIN AND TAZOBACTAM 4; .5 G/20ML; G/20ML
4.5 INJECTION, POWDER, LYOPHILIZED, FOR SOLUTION INTRAVENOUS EVERY 6 HOURS
Qty: 0 | Refills: 0 | Status: DISCONTINUED | OUTPATIENT
Start: 2017-06-10 | End: 2017-06-12

## 2017-06-10 RX ORDER — VANCOMYCIN HCL 1 G
VIAL (EA) INTRAVENOUS
Qty: 0 | Refills: 0 | Status: DISCONTINUED | OUTPATIENT
Start: 2017-06-10 | End: 2017-06-11

## 2017-06-10 RX ORDER — VANCOMYCIN HCL 1 G
1000 VIAL (EA) INTRAVENOUS EVERY 12 HOURS
Qty: 0 | Refills: 0 | Status: DISCONTINUED | OUTPATIENT
Start: 2017-06-10 | End: 2017-06-11

## 2017-06-10 RX ADMIN — Medication 4 MILLILITER(S): at 11:24

## 2017-06-10 RX ADMIN — Medication 650 MILLIGRAM(S): at 00:40

## 2017-06-10 RX ADMIN — Medication 4 MILLILITER(S): at 18:53

## 2017-06-10 RX ADMIN — Medication 1 TABLET(S): at 13:04

## 2017-06-10 RX ADMIN — HEPARIN SODIUM 5000 UNIT(S): 5000 INJECTION INTRAVENOUS; SUBCUTANEOUS at 13:04

## 2017-06-10 RX ADMIN — Medication 4 MILLILITER(S): at 07:47

## 2017-06-10 RX ADMIN — Medication 1 TABLET(S): at 23:26

## 2017-06-10 RX ADMIN — SODIUM CHLORIDE 2000 MILLILITER(S): 9 INJECTION INTRAMUSCULAR; INTRAVENOUS; SUBCUTANEOUS at 01:49

## 2017-06-10 RX ADMIN — Medication 250 MILLIGRAM(S): at 17:52

## 2017-06-10 RX ADMIN — PIPERACILLIN AND TAZOBACTAM 200 GRAM(S): 4; .5 INJECTION, POWDER, LYOPHILIZED, FOR SOLUTION INTRAVENOUS at 04:43

## 2017-06-10 RX ADMIN — Medication 4 MILLILITER(S): at 00:40

## 2017-06-10 RX ADMIN — PANTOPRAZOLE SODIUM 40 MILLIGRAM(S): 20 TABLET, DELAYED RELEASE ORAL at 07:46

## 2017-06-10 RX ADMIN — Medication 250 MILLIGRAM(S): at 04:43

## 2017-06-10 RX ADMIN — Medication 1 TABLET(S): at 07:46

## 2017-06-10 RX ADMIN — PIPERACILLIN AND TAZOBACTAM 200 GRAM(S): 4; .5 INJECTION, POWDER, LYOPHILIZED, FOR SOLUTION INTRAVENOUS at 11:23

## 2017-06-10 RX ADMIN — PIPERACILLIN AND TAZOBACTAM 200 GRAM(S): 4; .5 INJECTION, POWDER, LYOPHILIZED, FOR SOLUTION INTRAVENOUS at 17:28

## 2017-06-10 RX ADMIN — Medication 4 MILLILITER(S): at 23:26

## 2017-06-10 RX ADMIN — PIPERACILLIN AND TAZOBACTAM 200 GRAM(S): 4; .5 INJECTION, POWDER, LYOPHILIZED, FOR SOLUTION INTRAVENOUS at 23:26

## 2017-06-10 NOTE — PROGRESS NOTE ADULT - SUBJECTIVE AND OBJECTIVE BOX
INTERVAL HPI/OVERNIGHT EVENTS:  Patient was seen and examined at bedside. As per nurse and patient, no o/n events, patient resting comfortably. No complaints at this time. Patient denies fever, chills, dizziness, weakness, HA, Changes in vision, CP, palpitations, SOB, cough, N/V/D/C, dysuria, changes in bowel movements, LE edema.    VITAL SIGNS:  T(F): 98  HR: 82  BP: 108/67  RR: 18  SpO2: 95%  Wt(kg): --    PHYSICAL EXAM:    Constitutional: WDWN, NAD  Eyes: PERRL, EOMI, sclera non-icteric  Neck: supple, trachea midline, no masses, no JVD  Respiratory: CTA b/l, good air entry b/l, no wheezing, rhonchi, rales, without accessory muscle use and no intercostal retractions  Cardiovascular: RRR, normal S1S2, no M/R/G  Gastrointestinal: soft, NTND, no masses palpable, BS normal  Extremities: Warm, well perfused, pulses equal bilateral upper and lower extremities, no edema, no clubbing  Neurological: AAOx3, CN Grossly intact  Skin: Normal temperature, warm, dry    MEDICATIONS  (STANDING):  heparin  Injectable 5000Unit(s) SubCutaneous every 8 hours  pantoprazole    Tablet 40milliGRAM(s) Oral before breakfast  levoFLOXacin  Tablet 750milliGRAM(s) Oral every 24 hours  lactobacillus acidophilus 1Tablet(s) Oral every 8 hours  acetylcysteine 20% Inhalation 4milliLiter(s) Inhalation every 6 hours  piperacillin/tazobactam IVPB. 4.5Gram(s) IV Intermittent every 6 hours  vancomycin  IVPB 1000milliGRAM(s) IV Intermittent every 12 hours  piperacillin/tazobactam IVPB.  IV Intermittent   vancomycin  IVPB  IV Intermittent     MEDICATIONS  (PRN):  ALBUTerol/ipratropium for Nebulization 3milliLiter(s) Nebulizer every 4 hours PRN Shortness of Breath and/or Wheezing  guaiFENesin/dextromethorphan  Syrup 10milliLiter(s) Oral every 6 hours PRN Cough  acetaminophen   Tablet 650milliGRAM(s) Oral every 6 hours PRN For Temp greater than 38 C (100.4 F)      Allergies    No Known Allergies    Intolerances        LABS:                        11.7   5.9   )-----------( 399      ( 10 Mesfin 2017 02:17 )             34.1     06-10    138  |  101  |  7   ----------------------------<  118<H>  3.8   |  24  |  0.50    Ca    9.3      10 Mesfin 2017 06:36  Mg     1.9     06-09        Urinalysis Basic - ( 10 Mesfin 2017 08:50 )    Color: Yellow / Appearance: Clear / SG: <=1.005 / pH: x  Gluc: x / Ketone: NEGATIVE  / Bili: NEGATIVE / Urobili: 0.2 E.U./dL   Blood: x / Protein: NEGATIVE mg/dL / Nitrite: NEGATIVE   Leuk Esterase: NEGATIVE / RBC: x / WBC x   Sq Epi: x / Non Sq Epi: x / Bacteria: x        RADIOLOGY & ADDITIONAL TESTS: INTERVAL HPI/OVERNIGHT EVENTS:  Patient was seen and examined at bedside. As per nurse and patient + fever 100.6 overnight with lactate >2, started vancomycin and zosyn (day 1) given still febrile. Patient +cough, no chest pain, no current fever     VITAL SIGNS:  T(F): 98  HR: 82  BP: 108/67  RR: 18  SpO2: 95%  Wt(kg): --    PHYSICAL EXAM:    Constitutional: WDWN, NAD  Eyes: PERRL, EOMI, sclera non-icteric  Neck: supple, trachea midline, no masses, no JVD  Respiratory: Bronchial breath sounds, egophony on left lower lobe, +crackles LLL ~same as RLL  Cardiovascular: RRR, normal S1S2, no M/R/G  Gastrointestinal: soft, NTND, no masses palpable, BS normal  Extremities: Warm, well perfused, pulses equal bilateral upper and lower extremities, no edema, no clubbing; difficulty moving right leg.   Neurological: AAOx3, CN Grossly intact  Skin: Normal temperature, warm, dry    MEDICATIONS  (STANDING):  heparin  Injectable 5000Unit(s) SubCutaneous every 8 hours  pantoprazole    Tablet 40milliGRAM(s) Oral before breakfast  levoFLOXacin  Tablet 750milliGRAM(s) Oral every 24 hours  lactobacillus acidophilus 1Tablet(s) Oral every 8 hours  acetylcysteine 20% Inhalation 4milliLiter(s) Inhalation every 6 hours  piperacillin/tazobactam IVPB. 4.5Gram(s) IV Intermittent every 6 hours  vancomycin  IVPB 1000milliGRAM(s) IV Intermittent every 12 hours  piperacillin/tazobactam IVPB.  IV Intermittent   vancomycin  IVPB  IV Intermittent     MEDICATIONS  (PRN):  ALBUTerol/ipratropium for Nebulization 3milliLiter(s) Nebulizer every 4 hours PRN Shortness of Breath and/or Wheezing  guaiFENesin/dextromethorphan  Syrup 10milliLiter(s) Oral every 6 hours PRN Cough  acetaminophen   Tablet 650milliGRAM(s) Oral every 6 hours PRN For Temp greater than 38 C (100.4 F)      Allergies    No Known Allergies    Intolerances        LABS:                        11.7   5.9   )-----------( 399      ( 10 Mesfin 2017 02:17 )             34.1     06-10    138  |  101  |  7   ----------------------------<  118<H>  3.8   |  24  |  0.50    Ca    9.3      10 Mesfin 2017 06:36  Mg     1.9     06-09        Urinalysis Basic - ( 10 Mesfin 2017 08:50 )    Color: Yellow / Appearance: Clear / SG: <=1.005 / pH: x  Gluc: x / Ketone: NEGATIVE  / Bili: NEGATIVE / Urobili: 0.2 E.U./dL   Blood: x / Protein: NEGATIVE mg/dL / Nitrite: NEGATIVE   Leuk Esterase: NEGATIVE / RBC: x / WBC x   Sq Epi: x / Non Sq Epi: x / Bacteria: x        RADIOLOGY & ADDITIONAL TESTS:

## 2017-06-10 NOTE — PROGRESS NOTE ADULT - SUBJECTIVE AND OBJECTIVE BOX
Interval Events:  Patient seen and examined at bedside. T max 100.6, continues to cough but no sputum production    MEDICATIONS:    heparin  Injectable 5000Unit(s) SubCutaneous every 8 hours  ALBUTerol/ipratropium for Nebulization 3milliLiter(s) Nebulizer every 4 hours PRN  guaiFENesin/dextromethorphan  Syrup 10milliLiter(s) Oral every 6 hours PRN  pantoprazole    Tablet 40milliGRAM(s) Oral before breakfast  levoFLOXacin  Tablet 750milliGRAM(s) Oral every 24 hours  lactobacillus acidophilus 1Tablet(s) Oral every 8 hours  acetylcysteine 20% Inhalation 4milliLiter(s) Inhalation every 6 hours  acetaminophen   Tablet 650milliGRAM(s) Oral every 6 hours PRN  piperacillin/tazobactam IVPB. 4.5Gram(s) IV Intermittent every 6 hours  vancomycin  IVPB 1000milliGRAM(s) IV Intermittent every 12 hours  piperacillin/tazobactam IVPB.  IV Intermittent   vancomycin  IVPB  IV Intermittent       Allergies    No Known Allergies    Intolerances        Vital Signs Last 24 Hrs  T(C): 36.7, Max: 38.1 (06-09 @ 23:55)  T(F): 98, Max: 100.6 (06-09 @ 23:55)  HR: 82 (74 - 89)  BP: 108/67 (97/66 - 149/69)  BP(mean): --  RR: 18 (18 - 20)  SpO2: 95% (92% - 96%)    I & Os for current day (as of 06-10 @ 11:59)  =============================================  IN: 1700 ml / OUT: 300 ml / NET: 1400 ml        LABS:      CBC Full  -  ( 10 Mesfin 2017 02:17 )  WBC Count : 5.9 K/uL  Hemoglobin : 11.7 g/dL  Hematocrit : 34.1 %  Platelet Count - Automated : 399 K/uL  Mean Cell Volume : 85.9 fL  Mean Cell Hemoglobin : 29.5 pg  Mean Cell Hemoglobin Concentration : 34.3 g/dL      06-10    138  |  101  |  7   ----------------------------<  118<H>  3.8   |  24  |  0.50    Ca    9.3      10 Mesfin 2017 06:36  Mg     1.9     06-09            Urinalysis Basic - ( 10 Mesfin 2017 08:50 )    Color: Yellow / Appearance: Clear / SG: <=1.005 / pH: x  Gluc: x / Ketone: NEGATIVE  / Bili: NEGATIVE / Urobili: 0.2 E.U./dL   Blood: x / Protein: NEGATIVE mg/dL / Nitrite: NEGATIVE   Leuk Esterase: NEGATIVE / RBC: x / WBC x   Sq Epi: x / Non Sq Epi: x / Bacteria: x                RADIOLOGY & ADDITIONAL STUDIES (The following images were personally reviewed): Interval Events:  Patient seen and examined at bedside. T max 100.6, continues to cough but no sputum production    MEDICATIONS:    heparin  Injectable 5000Unit(s) SubCutaneous every 8 hours  ALBUTerol/ipratropium for Nebulization 3milliLiter(s) Nebulizer every 4 hours PRN  guaiFENesin/dextromethorphan  Syrup 10milliLiter(s) Oral every 6 hours PRN  pantoprazole    Tablet 40milliGRAM(s) Oral before breakfast  levoFLOXacin  Tablet 750milliGRAM(s) Oral every 24 hours  lactobacillus acidophilus 1Tablet(s) Oral every 8 hours  acetylcysteine 20% Inhalation 4milliLiter(s) Inhalation every 6 hours  acetaminophen   Tablet 650milliGRAM(s) Oral every 6 hours PRN  piperacillin/tazobactam IVPB. 4.5Gram(s) IV Intermittent every 6 hours  vancomycin  IVPB 1000milliGRAM(s) IV Intermittent every 12 hours  piperacillin/tazobactam IVPB.  IV Intermittent   vancomycin  IVPB  IV Intermittent       Allergies    No Known Allergies    Intolerances        Vital Signs Last 24 Hrs  T(C): 36.7, Max: 38.1 (06-09 @ 23:55)  T(F): 98, Max: 100.6 (06-09 @ 23:55)  HR: 82 (74 - 89)  BP: 108/67 (97/66 - 149/69)  BP(mean): --  RR: 18 (18 - 20)  SpO2: 95% (92% - 96%)    I & Os for current day (as of 06-10 @ 11:59)  =============================================  IN: 1700 ml / OUT: 300 ml / NET: 1400 ml        LABS:      CBC Full  -  ( 10 Mesfin 2017 02:17 )  WBC Count : 5.9 K/uL  Hemoglobin : 11.7 g/dL  Hematocrit : 34.1 %  Platelet Count - Automated : 399 K/uL  Mean Cell Volume : 85.9 fL  Mean Cell Hemoglobin : 29.5 pg  Mean Cell Hemoglobin Concentration : 34.3 g/dL      06-10    138  |  101  |  7   ----------------------------<  118<H>  3.8   |  24  |  0.50    Ca    9.3      10 Mesfin 2017 06:36  Mg     1.9     06-09            Urinalysis Basic - ( 10 Mesfin 2017 08:50 )    Color: Yellow / Appearance: Clear / SG: <=1.005 / pH: x  Gluc: x / Ketone: NEGATIVE  / Bili: NEGATIVE / Urobili: 0.2 E.U./dL   Blood: x / Protein: NEGATIVE mg/dL / Nitrite: NEGATIVE   Leuk Esterase: NEGATIVE / RBC: x / WBC x   Sq Epi: x / Non Sq Epi: x / Bacteria: x          INTERPRETATION:    Resident preliminary report.     XR CHEST [facility] dated 6/10/2017 2:00 AM    INDICATION: 61 years-old Female with Fever.    PRIOR STUDIES: 6/7/2017 1:27 AM    FINDINGS: Persistent left lower zone opacity.  Possible small left   pleural effusion. The right lung is clear. The heart size and mediastinal   contour are within normal limits.    IMPRESSION:  No change in left lower lobe consolidation which probably representing   pneumonia.      RADIOLOGY & ADDITIONAL STUDIES (The following images were personally reviewed):

## 2017-06-10 NOTE — PROGRESS NOTE ADULT - ATTENDING COMMENTS
Patient seen and examined with house-staff during bedside rounds.  Fellow's note read, including vitals, physical findings, laboratory data, and radiological reports.   Revisions included below.  Direct personal management at bed side and extensive interpretation of the data.  Plan was outlined and discussed in details with the housestaff.  Decision making of high complexity.  The patient is clinically stable. The patient spiked a temperature without any increase in the white count no deterioration off her oxygen saturation.  the patient was started on vancomycin and Zosyn and now she is on triple therapy.  The patient is been treated multilobar pneumonia and the temperature curve is improving since admission.. Her Legionella antigen was negative again, and mycoplasma titers are consistent with previous infection.  There was no change in the chest x-ray

## 2017-06-10 NOTE — PROGRESS NOTE ADULT - PROBLEM SELECTOR PLAN 2
Resolved. 4 episodes loose stool previous evening. Placed on contact and ordered C.diff; patient with no further episodes overnight and formed stool this AM and no increase in WBC count or abdominal sx/signs  consistetn with infection. C.diff negative this PM.   -Discontinue isolation. Resolved. 4 episodes loose stool previous evening.  negative  c.diff

## 2017-06-10 NOTE — CHART NOTE - NSCHARTNOTEFT_GEN_A_CORE
PGY-1 EVENT NOTE    Notified by nurse of fever spike of 100.6. Examined patient at bedside.    SUBJECTIVE: Patient endorsed fever and was concerned about spike after discontinuation of vancomycin + zosyn. Concerned about oxygen requirement.     VITAL SIGNS:  T(C): 38.1, Max: 38.1 (06-09 @ 23:55)  T(F): 100.6, Max: 100.6 (06-09 @ 23:55)  HR: 86 (76 - 89)  BP: 149/69 (97/66 - 149/69)  BP(mean): --  RR: 20 (18 - 20)  SpO2: 92% (92% - 98%)  Wt(kg): --    PHYSICAL EXAM:    Constitutional: WDWN; NAD. Patient wearing NC  Head: NC/AT  Eyes: PERRL, EOMI, anicteric sclera  ENT: MMM  Neck: supple; no JVD or thyromegaly  Respiratory: bibasilar fine crackles , R> L.   Cardiac: +S1/S2; RRR; no M/R/G;  Gastrointestinal: soft, NT/ND; no rebound or guarding; +BSx4  Extremities: WWP, no clubbing or cyanosis; no peripheral edema  Musculoskeletal: NROM x4; no joint swelling, tenderness or erythema  Vascular: 2+ radial, femoral, DP/PT pulses B/L  Dermatologic: skin warm, dry and intact; no rashes, wounds, or scars  Lymphatic: no submandibular or cervical LAD  Neurologic: AAOx3; CNII-XII grossly intact; no focal deficits  Psychiatric: affect and characteristics of appearance, verbalizations, behaviors are appropriate PGY-1 EVENT NOTE    Notified by nurse of fever spike of 100.6. Examined patient at bedside.    SUBJECTIVE: Patient endorsed fever and was concerned about spike after discontinuation of vancomycin + zosyn. Concerned about oxygen requirement.     VITAL SIGNS:  T(C): 38.1, Max: 38.1 (06-09 @ 23:55)  T(F): 100.6, Max: 100.6 (06-09 @ 23:55)  HR: 86 (76 - 89)  BP: 149/69 (97/66 - 149/69)  BP(mean): --  RR: 20 (18 - 20)  SpO2: 92% (92% - 98%)  Wt(kg): --    PHYSICAL EXAM:    Constitutional: WDWN; NAD. Patient wearing NC  Head: NC/AT  Eyes: PERRL, EOMI, anicteric sclera  ENT: MMM  Neck: supple; no JVD or thyromegaly  Respiratory: bibasilar fine crackles , R> L.   Cardiac: +S1/S2; RRR; no M/R/G;  Gastrointestinal: soft, NT/ND; no rebound or guarding; +BSx4  Extremities: WWP, no clubbing or cyanosis; no peripheral edema  Vascular: 2+ radial, femoral, DP/PT pulses B/L  Neurologic: AAOx3; CNII-XII grossly intact; no focal deficits    A/P: PGY-1 EVENT NOTE    Notified by nurse of fever spike of 100.6. Examined patient at bedside.    SUBJECTIVE: Patient endorsed fever and was concerned about spike after discontinuation of vancomycin + zosyn. Concerned about oxygen requirement.     VITAL SIGNS:  T(C): 38.1, Max: 38.1 (06-09 @ 23:55)  T(F): 100.6, Max: 100.6 (06-09 @ 23:55)  HR: 86 (76 - 89)  BP: 149/69 (97/66 - 149/69)  BP(mean): --  RR: 20 (18 - 20)  SpO2: 92% (92% - 98%)  Wt(kg): --    PHYSICAL EXAM:    Constitutional: WDWN; NAD. Patient wearing NC  Head: NC/AT  Eyes: PERRL, EOMI, anicteric sclera  ENT: MMM  Neck: supple; no JVD or thyromegaly  Respiratory: bibasilar fine crackles , R> L.   Cardiac: +S1/S2; RRR; no M/R/G;  Gastrointestinal: soft, NT/ND; no rebound or guarding; +BSx4  Extremities: WWP, no clubbing or cyanosis; no peripheral edema  Vascular: 2+ radial, femoral, DP/PT pulses B/L  Neurologic: AAOx3; CNII-XII grossly intact; no focal deficits    A/P: Case discussed with resident  - CXR shot; showed no interval change  - UA ordered  - BCx x1 sent (patient refused 2nd set)  - CBC showed no leukocytosis  - lactate of 2.1; given 500 cc bolus. Will trend to clearance and monitor respiratory status.   - Decision made to restart vancomycin + zosyn given that patient spiked after removal of vanc + zosyn.

## 2017-06-10 NOTE — PROGRESS NOTE ADULT - PROBLEM SELECTOR PLAN 1
- Clinically improving and fever cureve overall down but spile too 100.6 last night  - broadened to vanc/zos/lev last night  - no evidence of effusion on CT vut will check with ultrasound  - would continue antibiotics as is for now, if does not improve by monday may repeat CT chest to look for abscess or necrotizing PNA

## 2017-06-10 NOTE — PROGRESS NOTE ADULT - PROBLEM SELECTOR PLAN 1
Overall fever curve trending down, though still spiking and still having fevers. Pt with no recent hospitalization or recent use of IV Abx (only took 1 dose of Azithromycin), reports rash with Penicillin.  RVP negative. Left base consolidation.  - c/w Levaquin 750mg PO daily for 5 day course (day 5/5)  - c/w oxygen therapy with NC as needed, SpO2 %, 2LNC; patient not requiring oxygen, no desaturation when off; no desaturation when walking with PT  - f/u blood cx: No growth to date  - Vancomycin/zosyn discontinued per Dr. Montelongo (pulm); patient last day of levaquin for CAP; will check ultrasound for effusions; mycoplasma pending; patient potential for DC today if OK with pulm. Will f/u. Overall fever curve trending down, though still spiking and still having fevers. Pt with no recent hospitalization or recent use of IV Abx (only took 1 dose of Azithromycin), reports rash with Penicillin.  RVP negative. Left base consolidation.  - c/w Levaquin 750mg PO daily for 5 day course (day 5/5)  - c/w oxygen therapy with NC as needed, SpO2 %, 2LNC; patient not requiring oxygen, no desaturation when off; no desaturation when walking with PT  - f/u blood cx: No growth to date  -  no effusions seen on ultrasound with pulmonary fellow, vanc+zosyn (day 1), will continue until monday; if no change consider repeat CT scan. Check troughs.

## 2017-06-11 PROCEDURE — 99232 SBSQ HOSP IP/OBS MODERATE 35: CPT | Mod: GC

## 2017-06-11 PROCEDURE — 99233 SBSQ HOSP IP/OBS HIGH 50: CPT | Mod: GC

## 2017-06-11 RX ADMIN — Medication 1 TABLET(S): at 23:00

## 2017-06-11 RX ADMIN — Medication 1 TABLET(S): at 13:46

## 2017-06-11 RX ADMIN — Medication 3 MILLILITER(S): at 13:42

## 2017-06-11 RX ADMIN — PIPERACILLIN AND TAZOBACTAM 200 GRAM(S): 4; .5 INJECTION, POWDER, LYOPHILIZED, FOR SOLUTION INTRAVENOUS at 07:25

## 2017-06-11 RX ADMIN — Medication 250 MILLIGRAM(S): at 07:25

## 2017-06-11 RX ADMIN — PANTOPRAZOLE SODIUM 40 MILLIGRAM(S): 20 TABLET, DELAYED RELEASE ORAL at 07:25

## 2017-06-11 RX ADMIN — Medication 4 MILLILITER(S): at 11:21

## 2017-06-11 RX ADMIN — PIPERACILLIN AND TAZOBACTAM 200 GRAM(S): 4; .5 INJECTION, POWDER, LYOPHILIZED, FOR SOLUTION INTRAVENOUS at 11:20

## 2017-06-11 RX ADMIN — Medication 4 MILLILITER(S): at 19:39

## 2017-06-11 RX ADMIN — Medication 4 MILLILITER(S): at 07:25

## 2017-06-11 RX ADMIN — HEPARIN SODIUM 5000 UNIT(S): 5000 INJECTION INTRAVENOUS; SUBCUTANEOUS at 13:42

## 2017-06-11 RX ADMIN — PIPERACILLIN AND TAZOBACTAM 200 GRAM(S): 4; .5 INJECTION, POWDER, LYOPHILIZED, FOR SOLUTION INTRAVENOUS at 17:20

## 2017-06-11 RX ADMIN — Medication 1 TABLET(S): at 07:25

## 2017-06-11 NOTE — PROGRESS NOTE ADULT - PROBLEM SELECTOR PLAN 1
- Clinically improving and fever curve doen  - would D/C Vanco  - no effusion on ultrasound  - if continues to be afebrile possibly can transition to PO tomorrow

## 2017-06-11 NOTE — PROGRESS NOTE ADULT - ATTENDING COMMENTS
Patient seen and examined with house-staff during bedside rounds.  [place] note read, including vitals, physical findings, laboratory data, and radiological reports.   Revisions included below.  Direct personal management at bed side and extensive interpretation of the data.  Plan was outlined and discussed in details with the housestaff.  Decision making of high complexity  US no effusion.  DC vanco

## 2017-06-11 NOTE — PROGRESS NOTE ADULT - SUBJECTIVE AND OBJECTIVE BOX
Patient is a 61y old  Female who presents with a chief complaint of Shortness of breath (05 Jun 2017 18:29)      INTERVAL HPI/OVERNIGHT EVENTS: No acute events O/N. Improving. Still coughing with deep breaths.    Review of Systems: 12 point review of systems otherwise negative  ( - )fevers/chills  ( - ) dyspnea  ( - ) cough  ( - ) chest pain  ( - ) palpatations  ( - ) dizziness/lightheadedness  ( - ) nausea/vomiting  ( - ) abd pain  ( - ) diarrhea  ( - ) melena  ( - ) hematochezia  ( - ) dysuria  ( - ) hematuria  ( - ) leg swelling  ( -) calf tenderness  ( - ) motor weakness  ( - ) extremity numbness  ( - ) back pain  ( + ) tolerating POs  ( + ) BM    MEDICATIONS  (STANDING):  heparin  Injectable 5000Unit(s) SubCutaneous every 8 hours  pantoprazole    Tablet 40milliGRAM(s) Oral before breakfast  levoFLOXacin  Tablet 750milliGRAM(s) Oral every 24 hours  lactobacillus acidophilus 1Tablet(s) Oral every 8 hours  acetylcysteine 20% Inhalation 4milliLiter(s) Inhalation every 6 hours  piperacillin/tazobactam IVPB. 4.5Gram(s) IV Intermittent every 6 hours  piperacillin/tazobactam IVPB.  IV Intermittent     MEDICATIONS  (PRN):  ALBUTerol/ipratropium for Nebulization 3milliLiter(s) Nebulizer every 4 hours PRN Shortness of Breath and/or Wheezing  guaiFENesin/dextromethorphan  Syrup 10milliLiter(s) Oral every 6 hours PRN Cough  acetaminophen   Tablet 650milliGRAM(s) Oral every 6 hours PRN For Temp greater than 38 C (100.4 F)      Allergies    No Known Allergies    Intolerances          Vital Signs Last 24 Hrs  T(C): 36.7, Max: 36.9 (06-10 @ 16:11)  T(F): 98, Max: 98.5 (06-10 @ 16:11)  HR: 70 (70 - 85)  BP: 98/67 (98/67 - 129/71)  BP(mean): --  RR: 16 (16 - 18)  SpO2: 94% (94% - 94%)  CAPILLARY BLOOD GLUCOSE      I & Os for current day (as of 06-11 @ 12:54)  =============================================  IN: 800 ml / OUT: 1000 ml / NET: -200 ml      Physical Exam:      General:  Well appearing, NAD, not cachetic  HEENT:  Nonicteric, PERRLA  CV:  RRR, no murmur, no JVD  Lungs:  CTA B/L, no wheezes, rales, rhonchi  Abdomen:  Soft, non-tender, no distended, positive BS, no hepatosplenomegaly  Extremities:  2+ pulses, no c/c, no edema  Skin:  Warm and dry, no rashes  :  No valencia  Neuro:  AAOx3, non-focal, CN II-XII grossly intact  No Restraints    LABS:                        11.7   5.9   )-----------( 399      ( 10 Mesfin 2017 02:17 )             34.1     06-10    138  |  101  |  7   ----------------------------<  118<H>  3.8   |  24  |  0.50    Ca    9.3      10 Mesfin 2017 06:36        Urinalysis Basic - ( 10 Mesfin 2017 08:50 )    Color: Yellow / Appearance: Clear / SG: <=1.005 / pH: x  Gluc: x / Ketone: NEGATIVE  / Bili: NEGATIVE / Urobili: 0.2 E.U./dL   Blood: x / Protein: NEGATIVE mg/dL / Nitrite: NEGATIVE   Leuk Esterase: NEGATIVE / RBC: x / WBC x   Sq Epi: x / Non Sq Epi: x / Bacteria: x          RADIOLOGY & ADDITIONAL TESTS:    ---------------------------------------------------------------------------  I personally reviewed: [  ]EKG   [  ]CXR    [  ] CT    [  ]Other  ---------------------------------------------------------------------------  PLEASE CHECK WHEN PRESENT:     [  ]Heart Failure     [  ] Acute     [  ] Acute on Chronic     [  ] Chronic  -------------------------------------------------------------------     [  ]Diastolic [HFpEF]     [  ]Systolic [HFrEF]     [  ]Combined [HFpEF & HFrEF]     [  ]Other:  -------------------------------------------------------------------  [  ]LYLY     [  ]ATN     [  ]Reneal Medullary Necrosis     [  ]Renal Cortical Necrosis     [  ]Other Pathological Lesions:    [  ]CKD 1  [  ]CKD 2  [  ]CKD 3  [  ]CKD 4  [  ]CKD 5  [  ]Other  -------------------------------------------------------------------  [  ]Other/Unspecified:    --------------------------------------------------------------------    Abdominal Nutritional Status  [  ]Malnutrition: See Nutrition Note  [  ]Cachexia  [  ]Other:   [  ]Supplement Ordered:  [  ]Morbid Obesity (BMI >=40]

## 2017-06-11 NOTE — PROGRESS NOTE ADULT - SUBJECTIVE AND OBJECTIVE BOX
Interval Events:  Patient seen and examined at bedside. No acute events, afebrile overnight    MEDICATIONS:    heparin  Injectable 5000Unit(s) SubCutaneous every 8 hours  ALBUTerol/ipratropium for Nebulization 3milliLiter(s) Nebulizer every 4 hours PRN  guaiFENesin/dextromethorphan  Syrup 10milliLiter(s) Oral every 6 hours PRN  pantoprazole    Tablet 40milliGRAM(s) Oral before breakfast  levoFLOXacin  Tablet 750milliGRAM(s) Oral every 24 hours  lactobacillus acidophilus 1Tablet(s) Oral every 8 hours  acetylcysteine 20% Inhalation 4milliLiter(s) Inhalation every 6 hours  acetaminophen   Tablet 650milliGRAM(s) Oral every 6 hours PRN  piperacillin/tazobactam IVPB. 4.5Gram(s) IV Intermittent every 6 hours  vancomycin  IVPB 1000milliGRAM(s) IV Intermittent every 12 hours  piperacillin/tazobactam IVPB.  IV Intermittent   vancomycin  IVPB  IV Intermittent       Allergies    No Known Allergies    Intolerances        Vital Signs Last 24 Hrs  T(C): 36.7, Max: 36.9 (06-10 @ 16:11)  T(F): 98, Max: 98.5 (06-10 @ 16:11)  HR: 70 (70 - 85)  BP: 98/67 (98/67 - 129/71)  BP(mean): --  RR: 16 (16 - 18)  SpO2: 94% (94% - 94%)    I & Os for current day (as of 06-11 @ 11:37)  =============================================  IN: 800 ml / OUT: 1000 ml / NET: -200 ml        LABS:      CBC Full  -  ( 10 Mesfin 2017 02:17 )  WBC Count : 5.9 K/uL  Hemoglobin : 11.7 g/dL  Hematocrit : 34.1 %  Platelet Count - Automated : 399 K/uL  Mean Cell Volume : 85.9 fL  Mean Cell Hemoglobin : 29.5 pg  Mean Cell Hemoglobin Concentration : 34.3 g/dL      06-10    138  |  101  |  7   ----------------------------<  118<H>  3.8   |  24  |  0.50    Ca    9.3      10 Mesfin 2017 06:36            Urinalysis Basic - ( 10 Mesfin 2017 08:50 )    Color: Yellow / Appearance: Clear / SG: <=1.005 / pH: x  Gluc: x / Ketone: NEGATIVE  / Bili: NEGATIVE / Urobili: 0.2 E.U./dL   Blood: x / Protein: NEGATIVE mg/dL / Nitrite: NEGATIVE   Leuk Esterase: NEGATIVE / RBC: x / WBC x   Sq Epi: x / Non Sq Epi: x / Bacteria: x                RADIOLOGY & ADDITIONAL STUDIES (The following images were personally reviewed):

## 2017-06-12 VITALS
SYSTOLIC BLOOD PRESSURE: 94 MMHG | OXYGEN SATURATION: 94 % | RESPIRATION RATE: 16 BRPM | HEART RATE: 77 BPM | TEMPERATURE: 97 F | DIASTOLIC BLOOD PRESSURE: 61 MMHG

## 2017-06-12 DIAGNOSIS — R74.0 NONSPECIFIC ELEVATION OF LEVELS OF TRANSAMINASE AND LACTIC ACID DEHYDROGENASE [LDH]: ICD-10-CM

## 2017-06-12 LAB
ALBUMIN SERPL ELPH-MCNC: 3.4 G/DL — SIGNIFICANT CHANGE UP (ref 3.3–5)
ALP SERPL-CCNC: 241 U/L — HIGH (ref 40–120)
ALT FLD-CCNC: 128 U/L — HIGH (ref 10–45)
ANION GAP SERPL CALC-SCNC: 14 MMOL/L — SIGNIFICANT CHANGE UP (ref 5–17)
AST SERPL-CCNC: 96 U/L — HIGH (ref 10–40)
BILIRUB SERPL-MCNC: 0.3 MG/DL — SIGNIFICANT CHANGE UP (ref 0.2–1.2)
BUN SERPL-MCNC: 12 MG/DL — SIGNIFICANT CHANGE UP (ref 7–23)
CALCIUM SERPL-MCNC: 9.4 MG/DL — SIGNIFICANT CHANGE UP (ref 8.4–10.5)
CHLORIDE SERPL-SCNC: 99 MMOL/L — SIGNIFICANT CHANGE UP (ref 96–108)
CO2 SERPL-SCNC: 25 MMOL/L — SIGNIFICANT CHANGE UP (ref 22–31)
CREAT SERPL-MCNC: 0.7 MG/DL — SIGNIFICANT CHANGE UP (ref 0.5–1.3)
CULTURE RESULTS: SIGNIFICANT CHANGE UP
GLUCOSE SERPL-MCNC: 95 MG/DL — SIGNIFICANT CHANGE UP (ref 70–99)
HCT VFR BLD CALC: 36.6 % — SIGNIFICANT CHANGE UP (ref 34.5–45)
HGB BLD-MCNC: 12.5 G/DL — SIGNIFICANT CHANGE UP (ref 11.5–15.5)
MAGNESIUM SERPL-MCNC: 2.3 MG/DL — SIGNIFICANT CHANGE UP (ref 1.6–2.6)
MCHC RBC-ENTMCNC: 29.8 PG — SIGNIFICANT CHANGE UP (ref 27–34)
MCHC RBC-ENTMCNC: 34.2 G/DL — SIGNIFICANT CHANGE UP (ref 32–36)
MCV RBC AUTO: 87.1 FL — SIGNIFICANT CHANGE UP (ref 80–100)
PHOSPHATE SERPL-MCNC: 4.1 MG/DL — SIGNIFICANT CHANGE UP (ref 2.5–4.5)
PLATELET # BLD AUTO: 502 K/UL — HIGH (ref 150–400)
POTASSIUM SERPL-MCNC: 4.2 MMOL/L — SIGNIFICANT CHANGE UP (ref 3.5–5.3)
POTASSIUM SERPL-SCNC: 4.2 MMOL/L — SIGNIFICANT CHANGE UP (ref 3.5–5.3)
PROT SERPL-MCNC: 7.4 G/DL — SIGNIFICANT CHANGE UP (ref 6–8.3)
RBC # BLD: 4.2 M/UL — SIGNIFICANT CHANGE UP (ref 3.8–5.2)
RBC # FLD: 14.5 % — SIGNIFICANT CHANGE UP (ref 10.3–16.9)
SODIUM SERPL-SCNC: 138 MMOL/L — SIGNIFICANT CHANGE UP (ref 135–145)
SPECIMEN SOURCE: SIGNIFICANT CHANGE UP
WBC # BLD: 4.6 K/UL — SIGNIFICANT CHANGE UP (ref 3.8–10.5)
WBC # FLD AUTO: 4.6 K/UL — SIGNIFICANT CHANGE UP (ref 3.8–10.5)

## 2017-06-12 PROCEDURE — 87581 M.PNEUMON DNA AMP PROBE: CPT

## 2017-06-12 PROCEDURE — 82550 ASSAY OF CK (CPK): CPT

## 2017-06-12 PROCEDURE — 71046 X-RAY EXAM CHEST 2 VIEWS: CPT

## 2017-06-12 PROCEDURE — 86738 MYCOPLASMA ANTIBODY: CPT

## 2017-06-12 PROCEDURE — 86803 HEPATITIS C AB TEST: CPT

## 2017-06-12 PROCEDURE — 97161 PT EVAL LOW COMPLEX 20 MIN: CPT

## 2017-06-12 PROCEDURE — 87449 NOS EACH ORGANISM AG IA: CPT

## 2017-06-12 PROCEDURE — 85027 COMPLETE CBC AUTOMATED: CPT

## 2017-06-12 PROCEDURE — 84100 ASSAY OF PHOSPHORUS: CPT

## 2017-06-12 PROCEDURE — 93005 ELECTROCARDIOGRAM TRACING: CPT

## 2017-06-12 PROCEDURE — 82553 CREATINE MB FRACTION: CPT

## 2017-06-12 PROCEDURE — 99285 EMERGENCY DEPT VISIT HI MDM: CPT | Mod: 25

## 2017-06-12 PROCEDURE — 36415 COLL VENOUS BLD VENIPUNCTURE: CPT

## 2017-06-12 PROCEDURE — 87040 BLOOD CULTURE FOR BACTERIA: CPT

## 2017-06-12 PROCEDURE — 94640 AIRWAY INHALATION TREATMENT: CPT

## 2017-06-12 PROCEDURE — 87486 CHLMYD PNEUM DNA AMP PROBE: CPT

## 2017-06-12 PROCEDURE — 84484 ASSAY OF TROPONIN QUANT: CPT

## 2017-06-12 PROCEDURE — 93306 TTE W/DOPPLER COMPLETE: CPT

## 2017-06-12 PROCEDURE — 96374 THER/PROPH/DIAG INJ IV PUSH: CPT

## 2017-06-12 PROCEDURE — 71250 CT THORAX DX C-: CPT

## 2017-06-12 PROCEDURE — 81003 URINALYSIS AUTO W/O SCOPE: CPT

## 2017-06-12 PROCEDURE — 99239 HOSP IP/OBS DSCHRG MGMT >30: CPT

## 2017-06-12 PROCEDURE — 71045 X-RAY EXAM CHEST 1 VIEW: CPT

## 2017-06-12 PROCEDURE — 87798 DETECT AGENT NOS DNA AMP: CPT

## 2017-06-12 PROCEDURE — 83605 ASSAY OF LACTIC ACID: CPT

## 2017-06-12 PROCEDURE — 87633 RESP VIRUS 12-25 TARGETS: CPT

## 2017-06-12 PROCEDURE — 83735 ASSAY OF MAGNESIUM: CPT

## 2017-06-12 PROCEDURE — 85025 COMPLETE CBC W/AUTO DIFF WBC: CPT

## 2017-06-12 PROCEDURE — 80048 BASIC METABOLIC PNL TOTAL CA: CPT

## 2017-06-12 PROCEDURE — 80053 COMPREHEN METABOLIC PANEL: CPT

## 2017-06-12 RX ORDER — GUAIFENESIN/DEXTROMETHORPHAN 600MG-30MG
10 TABLET, EXTENDED RELEASE 12 HR ORAL
Qty: 560 | Refills: 0 | OUTPATIENT
Start: 2017-06-12 | End: 2017-06-26

## 2017-06-12 RX ADMIN — PANTOPRAZOLE SODIUM 40 MILLIGRAM(S): 20 TABLET, DELAYED RELEASE ORAL at 07:34

## 2017-06-12 RX ADMIN — PIPERACILLIN AND TAZOBACTAM 200 GRAM(S): 4; .5 INJECTION, POWDER, LYOPHILIZED, FOR SOLUTION INTRAVENOUS at 00:16

## 2017-06-12 RX ADMIN — Medication 4 MILLILITER(S): at 07:34

## 2017-06-12 RX ADMIN — Medication 1 TABLET(S): at 07:34

## 2017-06-12 RX ADMIN — PIPERACILLIN AND TAZOBACTAM 200 GRAM(S): 4; .5 INJECTION, POWDER, LYOPHILIZED, FOR SOLUTION INTRAVENOUS at 07:34

## 2017-06-12 NOTE — DISCHARGE NOTE ADULT - HOSPITAL COURSE
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 On admission met sepsis criteria with fever, tachycardia and elevated lactate. Patient started on levaquin, had negative legionellla, negative blood cultures. CT scan showed LLL consolidation with evolveing RLL consolidation. Patient continued to spike fevers so placed on vancomycin and zosyn, though overall trending down. Pulmonary consulted, Dr. Montelongo, who evaluated patient; determined by ultrasound no effusion and only consolidation. Patient will be discharged today to home, as currently off oxygen for 3 days, afebrile for 3 days. She will follow up with her primary care physician and Dr. Montelongo once discharged.

## 2017-06-12 NOTE — DISCHARGE NOTE ADULT - MEDICATION SUMMARY - MEDICATIONS TO TAKE
I will START or STAY ON the medications listed below when I get home from the hospital:    dextromethorphan-guaiFENesin 10 mg-100 mg/5 mL oral liquid  -- 10 milliliter(s) by mouth every 6 hours, As needed, Cough  -- Indication: For Cough

## 2017-06-12 NOTE — PROGRESS NOTE ADULT - PROBLEM SELECTOR PLAN 3
hep c screen (-)  pt w/o n/v/abd pain  suspect due to medication liver injury-- possibly antibiotic related.   all abx now dc'd as she has completed course for PNA  would avoid tylenol/nsaids

## 2017-06-12 NOTE — DISCHARGE NOTE ADULT - PLAN OF CARE
No recurrence/resolution of symptoms. You were admitted for community acquired pneumonia with a significant left lower lobe consolidation and prominent egophony which later evolved to become a multifocal pneumonia. You were started on Levaquin (Levofloxacin) as an antibiotic and while your fever curve was decreasing (from tmax 103) you were still febrile. You had a CT scan which showed left lobe consolidation and evolving consolidation in the right lower lobe. You received 2 days total of vancomycin and 3 days total of zosyn but these were discontinued as you have been afebrile for 3 days total. You have completed 7 days total of levaquin. Given your clinical and symptomatic improvement you do not need additional antibiotic coverage. You should follow up with your primary care physician within 1-2 weeks of discharge. During your hospitalization you were seen by the pulmonologist Dr. Montelongo. Please also follow up with him once discharged.

## 2017-06-12 NOTE — PROGRESS NOTE ADULT - SUBJECTIVE AND OBJECTIVE BOX
Patient is a 61y old  Female who presents with a chief complaint of Shortness of breath (12 Jun 2017 11:08)      INTERVAL HPI/OVERNIGHT EVENTS:    pt seen at 1030am    feels well  has mild NP cough  no dyspnea  afebrile x 48hrs        ROS  (- ) headache  ( -  )fevers/chills     (  - ) chest pain  (  - ) palpatations  ( - ) dizziness/lightheadedness  (  - ) nausea/vomiting  (  - ) abd pain  (  - ) diarrhea  (  - ) melena  (  - ) hematochezia  (  - ) dysuria   ( - ) hematuria  (  - ) leg swelling    ( - ) calf tenderness  (  - ) motor weakness  ( - ) extremity numbness  ( - ) back pain  ( + ) tolerating POs  ( + ) BM  ROS: 12 point review of systems otherwise negative              MEDICATIONS  (STANDING):  heparin  Injectable 5000Unit(s) SubCutaneous every 8 hours  pantoprazole    Tablet 40milliGRAM(s) Oral before breakfast  levoFLOXacin  Tablet 750milliGRAM(s) Oral every 24 hours  lactobacillus acidophilus 1Tablet(s) Oral every 8 hours  acetylcysteine 20% Inhalation 4milliLiter(s) Inhalation every 6 hours    MEDICATIONS  (PRN):  ALBUTerol/ipratropium for Nebulization 3milliLiter(s) Nebulizer every 4 hours PRN Shortness of Breath and/or Wheezing  guaiFENesin/dextromethorphan  Syrup 10milliLiter(s) Oral every 6 hours PRN Cough  acetaminophen   Tablet 650milliGRAM(s) Oral every 6 hours PRN For Temp greater than 38 C (100.4 F)      Allergies    No Known Allergies    Intolerances          Vital Signs Last 24 Hrs  T(C): 36.2, Max: 36.9 (06-11 @ 17:04)  T(F): 97.2, Max: 98.4 (06-11 @ 17:04)  HR: 77 (77 - 87)  BP: 94/61 (92/56 - 108/74)  BP(mean): --  RR: 16 (16 - 18)  SpO2: 94% (94% - 96%)  CAPILLARY BLOOD GLUCOSE      I & Os for current day (as of 06-12 @ 14:45)  =============================================  IN: 970 ml / OUT: 0 ml / NET: 970 ml      Physical Exam:    Daily     Daily   General:  Well appearing   HEENT:  Nonicteric, PERRLA, neck supple  CV:  [id number] , RRR, no murmur, no JVD  Lungs:  CTA B/L, no wheezes, rales, rhonchi  Abdomen:  positive BS, Soft, non-tender, non distended, no hepatosplenomegaly  Extremities:  2+ DP/radial pulses b/l, no cyanosis, no edema  Back: no cva or midline tenderness  Skin:  Warm and dry, no rashes  :  No valencia  Neuro:  AAOx3,  CN II-XII grossly intact, 5/5 str all 4 ext, sensation intact,    No Restraints    LABS:                        12.5   4.6   )-----------( 502      ( 12 Jun 2017 07:22 )             36.6     06-12    138  |  99  |  12  ----------------------------<  95  4.2   |  25  |  0.70    Ca    9.4      12 Jun 2017 07:22  Phos  4.1     06-12  Mg     2.3     06-12    TPro  7.4  /  Alb  3.4  /  TBili  0.3  /  DBili  x   /  AST  96<H>  /  ALT  128<H>  /  AlkPhos  241<H>  06-12            RADIOLOGY & ADDITIONAL TESTS:

## 2017-06-12 NOTE — DISCHARGE NOTE ADULT - PATIENT PORTAL LINK FT
“You can access the [facility] Patient Portal, offered by [facility], by registering with the following website: [email or web address]”

## 2017-06-12 NOTE — DISCHARGE NOTE ADULT - CARE PROVIDER_API CALL
Theresa Montelongo), Critical Care Medicine; Pulmonary Disease  [address] [place] [address]  Phone: [phone]  Fax: [phone]

## 2017-06-12 NOTE — PROGRESS NOTE ADULT - ASSESSMENT
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
62 y/o woman with multifocal PNA
60 y/o woman with multifocal PNA

## 2017-06-12 NOTE — DISCHARGE NOTE ADULT - CARE PLAN
Principal Discharge DX:	CAP (community acquired pneumonia)  Goal:	No recurrence/resolution of symptoms.  Instructions for follow-up, activity and diet:	You were admitted for community acquired pneumonia with a significant left lower lobe consolidation and prominent egophony which later evolved to become a multifocal pneumonia. You were started on Levaquin (Levofloxacin) as an antibiotic and while your fever curve was decreasing (from tmax 103) you were still febrile. You had a CT scan which showed left lobe consolidation and evolving consolidation in the right lower lobe. You received 2 days total of vancomycin and 3 days total of zosyn but these were discontinued as you have been afebrile for 3 days total. You have completed 7 days total of levaquin. Given your clinical and symptomatic improvement you do not need additional antibiotic coverage. You should follow up with your primary care physician within 1-2 weeks of discharge. During your hospitalization you were seen by the pulmonologist Dr. Montelongo. Please also follow up with him once discharged.

## 2017-06-12 NOTE — PROGRESS NOTE ADULT - PROBLEM SELECTOR PROBLEM 5
Nutrition, metabolism, and development symptoms
Prophylactic measure

## 2017-06-14 DIAGNOSIS — A41.9 SEPSIS, UNSPECIFIED ORGANISM: ICD-10-CM

## 2017-06-14 DIAGNOSIS — R09.02 HYPOXEMIA: ICD-10-CM

## 2017-06-14 DIAGNOSIS — J18.9 PNEUMONIA, UNSPECIFIED ORGANISM: ICD-10-CM

## 2017-06-14 DIAGNOSIS — M79.7 FIBROMYALGIA: ICD-10-CM

## 2017-06-14 DIAGNOSIS — R19.7 DIARRHEA, UNSPECIFIED: ICD-10-CM

## 2017-06-14 DIAGNOSIS — J96.01 ACUTE RESPIRATORY FAILURE WITH HYPOXIA: ICD-10-CM

## 2017-06-14 DIAGNOSIS — Z88.0 ALLERGY STATUS TO PENICILLIN: ICD-10-CM

## 2017-06-14 DIAGNOSIS — R79.89 OTHER SPECIFIED ABNORMAL FINDINGS OF BLOOD CHEMISTRY: ICD-10-CM

## 2017-06-14 DIAGNOSIS — E87.70 FLUID OVERLOAD, UNSPECIFIED: ICD-10-CM

## 2017-06-14 DIAGNOSIS — R65.20 SEVERE SEPSIS WITHOUT SEPTIC SHOCK: ICD-10-CM

## 2017-06-15 LAB
CULTURE RESULTS: SIGNIFICANT CHANGE UP
SPECIMEN SOURCE: SIGNIFICANT CHANGE UP

## 2017-06-21 PROBLEM — Z00.00 ENCOUNTER FOR PREVENTIVE HEALTH EXAMINATION: Status: ACTIVE | Noted: 2017-06-21

## 2017-06-23 ENCOUNTER — APPOINTMENT (OUTPATIENT)
Dept: PULMONOLOGY | Facility: CLINIC | Age: 62
End: 2017-06-23

## 2017-07-13 ENCOUNTER — APPOINTMENT (OUTPATIENT)
Dept: PULMONOLOGY | Facility: CLINIC | Age: 62
End: 2017-07-13

## 2022-08-24 ENCOUNTER — TRANSCRIPTION ENCOUNTER (OUTPATIENT)
Age: 67
End: 2022-08-24

## 2022-08-24 ENCOUNTER — APPOINTMENT (OUTPATIENT)
Dept: MRI IMAGING | Facility: CLINIC | Age: 67
End: 2022-08-24

## 2022-08-24 PROBLEM — M79.7 FIBROMYALGIA: Chronic | Status: ACTIVE | Noted: 2017-06-05

## 2022-08-24 PROCEDURE — 72148 MRI LUMBAR SPINE W/O DYE: CPT | Mod: MH

## 2023-06-16 ENCOUNTER — APPOINTMENT (OUTPATIENT)
Dept: OBGYN | Facility: CLINIC | Age: 68
End: 2023-06-16
Payer: MEDICARE

## 2023-06-16 VITALS — HEART RATE: 93 BPM | WEIGHT: 123 LBS | DIASTOLIC BLOOD PRESSURE: 88 MMHG | SYSTOLIC BLOOD PRESSURE: 117 MMHG

## 2023-06-16 DIAGNOSIS — G20 PARKINSON'S DISEASE: ICD-10-CM

## 2023-06-16 DIAGNOSIS — Z01.419 ENCOUNTER FOR GYNECOLOGICAL EXAMINATION (GENERAL) (ROUTINE) W/OUT ABNORMAL FINDINGS: ICD-10-CM

## 2023-06-16 DIAGNOSIS — Z83.3 FAMILY HISTORY OF DIABETES MELLITUS: ICD-10-CM

## 2023-06-16 DIAGNOSIS — Z78.9 OTHER SPECIFIED HEALTH STATUS: ICD-10-CM

## 2023-06-16 PROCEDURE — G0101: CPT

## 2023-06-16 RX ORDER — ROTIGOTINE 2 MG/24H
2 PATCH, EXTENDED RELEASE TRANSDERMAL
Refills: 0 | Status: ACTIVE | COMMUNITY

## 2023-06-16 RX ORDER — AMANTADINE HYDROCHLORIDE 100 MG/1
100 CAPSULE ORAL
Refills: 0 | Status: ACTIVE | COMMUNITY

## 2023-06-16 RX ORDER — CARBIDOPA AND LEVODOPA 25; 100 MG/1; MG/1
25-100 TABLET, EXTENDED RELEASE ORAL
Refills: 0 | Status: ACTIVE | COMMUNITY

## 2023-06-16 NOTE — PLAN
[FreeTextEntry1] : annual;: pap and hpv\par \par *parkinsons since 2017: sees specialist and is on meds\par pt was interested in HT because some studies showing in may slow progression,\par d/w pt at length r/b/a HT, if goes on may try lowest patch\par no heart disease/ clots/ stroke\par *Pt is retired Internist\par \par does skin check\par will do colonoscopy soon\par mammogram ordered, goes to [facility]

## 2023-06-16 NOTE — REVIEW OF SYSTEMS
[Negative] : Breast [de-identified] : parkinson [de-identified] : hashimotos but euthryoid [de-identified] : has had slighlty low wbc count

## 2023-06-16 NOTE — HISTORY OF PRESENT ILLNESS
[Patient reported PAP Smear was normal] : Patient reported PAP Smear was normal [Hot Flashes] : hot flashes [Night Sweats] : night sweats [No] : none [Options Discussed] : options discussed [Currently Active] : currently active [Men] : men [Yes] : pregnancy [Patient reported mammogram was normal] : Patient reported mammogram was normal [Patient reported breast sonogram was normal] : Patient reported breast sonogram was normal [Patient reported bone density results were normal] : Patient reported bone density results were normal [Patient reported colonoscopy was normal] : Patient reported colonoscopy was normal [FreeTextEntry1] : Patient present for annual office visit without complaint [Mammogramdate] : 2022 [PapSmeardate] : 01/2020 [TextBox_37] : osteopenia [TextBox_6] : menopause

## 2023-06-17 LAB — HPV HIGH+LOW RISK DNA PNL CVX: NOT DETECTED

## 2023-06-22 ENCOUNTER — TRANSCRIPTION ENCOUNTER (OUTPATIENT)
Age: 68
End: 2023-06-22

## 2023-06-22 LAB — CYTOLOGY CVX/VAG DOC THIN PREP: ABNORMAL
